# Patient Record
Sex: FEMALE | Race: WHITE | NOT HISPANIC OR LATINO | Employment: FULL TIME | URBAN - METROPOLITAN AREA
[De-identification: names, ages, dates, MRNs, and addresses within clinical notes are randomized per-mention and may not be internally consistent; named-entity substitution may affect disease eponyms.]

---

## 2017-04-21 ENCOUNTER — TRANSCRIBE ORDERS (OUTPATIENT)
Dept: ADMINISTRATIVE | Facility: HOSPITAL | Age: 32
End: 2017-04-21

## 2017-04-21 DIAGNOSIS — N64.89 BREASTS ASYMMETRICAL: Primary | ICD-10-CM

## 2017-05-22 ENCOUNTER — HOSPITAL ENCOUNTER (OUTPATIENT)
Dept: ULTRASOUND IMAGING | Facility: HOSPITAL | Age: 32
Discharge: HOME | End: 2017-05-22

## 2017-05-22 ENCOUNTER — HOSPITAL ENCOUNTER (OUTPATIENT)
Dept: MAMMOGRAPHY | Facility: HOSPITAL | Age: 32
Discharge: HOME | End: 2017-05-22
Attending: FAMILY MEDICINE | Admitting: FAMILY MEDICINE

## 2017-05-22 DIAGNOSIS — N64.89 BREASTS ASYMMETRICAL: ICD-10-CM

## 2017-05-22 PROCEDURE — 76642 ULTRASOUND BREAST LIMITED: CPT

## 2017-05-22 PROCEDURE — 77066 DX MAMMO INCL CAD BI: CPT | Performed by: RADIOLOGY

## 2017-05-22 PROCEDURE — 76642 ULTRASOUND BREAST LIMITED: CPT | Performed by: RADIOLOGY

## 2017-05-22 PROCEDURE — G0279 TOMOSYNTHESIS, MAMMO: HCPCS

## 2017-05-22 PROCEDURE — 77062 BREAST TOMOSYNTHESIS BI: CPT | Performed by: RADIOLOGY

## 2017-05-22 PROCEDURE — G0204 DX MAMMO INCL CAD BI: HCPCS

## 2019-05-09 ENCOUNTER — HOSPITAL ENCOUNTER (OUTPATIENT)
Dept: GENERAL RADIOLOGY | Facility: HOSPITAL | Age: 34
Discharge: HOME | End: 2019-05-09
Admitting: NURSE PRACTITIONER

## 2019-05-09 ENCOUNTER — TRANSCRIBE ORDERS (OUTPATIENT)
Dept: ADMINISTRATIVE | Facility: HOSPITAL | Age: 34
End: 2019-05-09

## 2019-05-09 DIAGNOSIS — Z87.442 HISTORY OF KIDNEY STONES: Primary | ICD-10-CM

## 2019-05-09 PROCEDURE — 74018 RADEX ABDOMEN 1 VIEW: CPT

## 2019-07-29 ENCOUNTER — CONSULT (OUTPATIENT)
Dept: SLEEP MEDICINE | Facility: HOSPITAL | Age: 34
End: 2019-07-29

## 2019-07-29 VITALS
WEIGHT: 225.8 LBS | HEIGHT: 63 IN | DIASTOLIC BLOOD PRESSURE: 63 MMHG | BODY MASS INDEX: 40.01 KG/M2 | HEART RATE: 98 BPM | OXYGEN SATURATION: 97 % | SYSTOLIC BLOOD PRESSURE: 134 MMHG

## 2019-07-29 DIAGNOSIS — E66.01 MORBID OBESITY (HCC): ICD-10-CM

## 2019-07-29 DIAGNOSIS — G47.33 OBSTRUCTIVE SLEEP APNEA, ADULT: ICD-10-CM

## 2019-07-29 DIAGNOSIS — R06.83 SNORING: Primary | ICD-10-CM

## 2019-07-29 PROCEDURE — 99204 OFFICE O/P NEW MOD 45 MIN: CPT | Performed by: INTERNAL MEDICINE

## 2019-07-29 RX ORDER — CHLORAL HYDRATE 500 MG
CAPSULE ORAL
COMMUNITY

## 2019-07-29 RX ORDER — ALBUTEROL SULFATE 90 UG/1
AEROSOL, METERED RESPIRATORY (INHALATION)
Refills: 4 | COMMUNITY
Start: 2019-07-25

## 2019-07-29 RX ORDER — ATORVASTATIN CALCIUM 40 MG/1
TABLET, FILM COATED ORAL
COMMUNITY
Start: 2016-11-23

## 2019-07-29 RX ORDER — PANTOPRAZOLE SODIUM 40 MG/1
TABLET, DELAYED RELEASE ORAL
Refills: 1 | COMMUNITY
Start: 2019-07-05

## 2019-07-29 RX ORDER — OLOPATADINE HYDROCHLORIDE 7 MG/ML
SOLUTION OPHTHALMIC
Refills: 5 | COMMUNITY
Start: 2019-07-25

## 2019-07-29 RX ORDER — DILTIAZEM HYDROCHLORIDE 60 MG/1
2 TABLET, FILM COATED ORAL 2 TIMES DAILY
Refills: 4 | COMMUNITY
Start: 2019-07-25

## 2019-07-29 RX ORDER — MONTELUKAST SODIUM 10 MG/1
10 TABLET ORAL DAILY
Refills: 5 | COMMUNITY
Start: 2019-07-25

## 2019-07-29 RX ORDER — LISINOPRIL 2.5 MG/1
2.5 TABLET ORAL DAILY
Refills: 5 | COMMUNITY
Start: 2019-07-07

## 2019-07-29 RX ORDER — NORGESTIMATE AND ETHINYL ESTRADIOL 7DAYSX3 28
KIT ORAL
COMMUNITY

## 2019-07-29 RX ORDER — METFORMIN HYDROCHLORIDE 500 MG/1
TABLET, EXTENDED RELEASE ORAL
COMMUNITY

## 2019-07-29 NOTE — PROGRESS NOTES
Subjective   Alessandra Villa is a 34 y.o. female is being seen for consultation today at the request of Chloe Power MD for the evaluation of snoring and nonrestorative sleep.    History of Present Illness  Patient states she has had snoring noted for at least 7 years.  She tosses and turns a lot at night she denies being told she had apneas.  She denies awakening gasping for breath.  Says she is generally not rested in the morning.  She has a morning headache almost daily.  She denies falling asleep if sitting quietly during the day.  She gets sleepy while driving occasionally.  She is sleepy even if she increases her time in bed.    She has history of loud snoring snores in all positions awaken with a dry mouth and sore throat.  She denies ever breaking her nose.  She has a history of reflux on medications.  She is rarely noted hypnagogue hallucinations and says she may have sleep paralysis 5 times per year.  She denies any history of cataplexy.  She denies kicking or jerking her legs at night.  She has some chronic pain in her back but does not think that it keeps her awake.  She admits to gaining 40 pounds in the past year.  She has a CDL and works as a .    She goes to bed about 9 PM.  She will fall asleep from 5 minutes to 1 hour.  She awakens 10 times during the night.  She thinks she gets 8 to 9 hours of sleep but is still tired.  She denies any history of hypertension she has occasional palpitations.  She has had diabetes known for 10 years.  She is been told she had polycystic ovary syndrome and also has kidney stones.  Allergies   Allergen Reactions   • Dapagliflozin Diarrhea   She has several environmental allergies.  Including cats, dust, mites, grass, trees, and mold.      Current Outpatient Medications:   •  albuterol sulfate  (90 Base) MCG/ACT inhaler, INHALE 2 PUFFS BY MOUTH EVERY SIX HOURS AS NEEDED, Disp: , Rfl: 4  •  atorvastatin (LIPITOR) 40 MG tablet, atorvastatin 40 mg tablet   Bedtime, Disp: , Rfl:   •  lisinopril (PRINIVIL,ZESTRIL) 2.5 MG tablet, Take 2.5 mg by mouth Daily. 200001, Disp: , Rfl: 5  •  metFORMIN ER (GLUCOPHAGE-XR) 500 MG 24 hr tablet, metformin  mg tablet,extended release 24 hr  Two times a day, Disp: , Rfl:   •  montelukast (SINGULAIR) 10 MG tablet, Take 10 mg by mouth Daily. 200001, Disp: , Rfl: 5  •  norgestimate-ethinyl estradiol (TRI-SPRINTEC) 0.18/0.215/0.25 MG-35 MCG per tablet, Tri-Sprintec (28) 0.18 mg(7)/0.215 mg(7)/0.25 mg(7)-35 mcg tablet, Disp: , Rfl:   •  Omega-3 1000 MG capsule, Omega 3, Disp: , Rfl:   •  pantoprazole (PROTONIX) 40 MG EC tablet, TAKE 1 TABLET BY MOUTH ONE TIME A DAY. (need appointment/labs for additional refills), Disp: , Rfl: 1  •  PAZEO 0.7 % solution, INSTILL 1 DROP IN EACH EYE EVERY TWELVE HOURS AS NEEDED, Disp: , Rfl: 5  •  SITagliptin (JANUVIA) 100 MG tablet, Januvia 100 mg tablet  Take 1 tab daily, Disp: , Rfl:   •  SYMBICORT 80-4.5 MCG/ACT inhaler, Inhale 2 puffs 2 (Two) Times a Day., Disp: , Rfl: 4    Social History    Tobacco Use      Smoking status: Never Smoker      Smokeless tobacco: Never Used       Social History     Substance and Sexual Activity   Alcohol Use No   • Frequency: Never       Caffeine: She occasionally has a cup of decaf coffee.  She has 3-4 servings of tea per week.  She has a cola 2-3 times per week    Past Medical History:   Diagnosis Date   • Diabetes mellitus (CMS/HCC)    • Kidney stone 2016       Past Surgical History:   Procedure Laterality Date   • KIDNEY STONE SURGERY  2016       Family History   Problem Relation Age of Onset   • Obesity Mother    • Sleep apnea Mother    • Stroke Father    • Obesity Father    • Asthma Father    • Obesity Brother    • Asthma Paternal Aunt    • Asthma Paternal Uncle    • Diabetes Maternal Grandmother    • Obesity Maternal Grandmother    • Thyroid disease Maternal Grandmother    • Diabetes Paternal Grandmother    • Obesity Paternal Grandmother    • Breast cancer Neg  "Hx    • Ovarian cancer Neg Hx        The following portions of the patient's history were reviewed and updated as appropriate: allergies, current medications, past family history, past medical history, past social history, past surgical history and problem list.    Review of Systems   Constitutional: Positive for fatigue and unexpected weight change.   HENT: Positive for postnasal drip and sinus pressure.    Eyes: Negative.    Respiratory: Positive for cough.    Gastrointestinal:        She complains of change in appetite and frequent heartburn   Endocrine: Positive for polydipsia and polyuria.   Genitourinary: Positive for frequency.   Musculoskeletal: Positive for arthralgias and back pain.   Skin: Negative.    Allergic/Immunologic: Positive for environmental allergies.   Neurological: Positive for numbness and headaches.   Hematological: Negative.    Psychiatric/Behavioral: Negative.    Lenox score is 4/24    Objective     /63   Pulse 98   Ht 160 cm (63\")   Wt 102 kg (225 lb 12.8 oz)   SpO2 97%   BMI 40.00 kg/m²      Physical Exam   Constitutional: She is oriented to person, place, and time. She appears well-developed and well-nourished.   She is morbidly obese.   HENT:   Head: Normocephalic and atraumatic.   She has nasal airway narrowing and Mallampati class II anatomy.   Eyes: EOM are normal. Pupils are equal, round, and reactive to light.   Neck: Normal range of motion. Neck supple.   Cardiovascular: Normal rate, regular rhythm and normal heart sounds.   Pulmonary/Chest: Effort normal and breath sounds normal.   Abdominal: Soft. Bowel sounds are normal.   Musculoskeletal: Normal range of motion. She exhibits no edema.   Neurological: She is alert and oriented to person, place, and time.   Skin: Skin is warm and dry.   Psychiatric: She has a normal mood and affect. Her behavior is normal.         Assessment/Plan   Alessandra was seen today for sleeping problem.    Diagnoses and all orders for this " visit:    Snoring  -     Home Sleep Study; Future    Obstructive sleep apnea, adult  -     Home Sleep Study; Future    Morbid obesity (CMS/[facility])  -     Ambulatory Referral to Weight Management Program    Patient has a history of snoring nonrestorative sleep.  I think gives an excellent story for obstructive sleep apnea.  We will plan to proceed to home sleep testing.  We will plan to do this with the watch pat device that has change of custody capability.  We have discussed possible treatments including CPAP, weight control, oral appliance, and surgery.  We have also discussed the long-term consequences of untreated obstructive sleep apnea.  She is encouraged to lose weight and is willing to be seen in weight management.  She is encouraged to avoid alcohol and sedatives close to bedtime.  She is encouraged to practice lateral position sleep.         Ed Coronado MD [facility]  Sleep Medicine  Pulmonary and Critical Care Medicine

## 2019-07-29 NOTE — PROGRESS NOTES
"[facility]  INITIAL OFFICE CONSULT      Alessandra Villa  1985  PCP: Chloe Power MD    SUBJECTIVE:   Alessandra Villa is a 34 y.o. female seen for a consultation visit regarding the following:     Chief Complaint:   Chief Complaint   Patient presents with   • Rapid Heart Rate          Consultation is requested by Chloe Power MD for evaluation of Rapid Heart Rate        History:  34-year-old female with no previous cardiac history presents today for initial consultation regarding palpitations atypical chest pain shortness of breath and fatigue with exertion.  Patient has a past medical history of dyslipidemia hypertension asthma diabetes probable sleep apnea.  In addition she reports that she is gained nearly 40 pounds since November.  She states far as back she remember when she was younger she is always had a heart rate that seems to \"race\" when she does physical exertion. She denies angina symptoms.  She states when she gets walks fast she is to the mailbox she heart rate goes very rapid and she seems to think this is resolving her down from doing a lot of physical activities.  She does try to work out few days a week with some light weights.  In addition she said atypical chest pain sharp knifelike discomfort not particular associate exertion had happens random in nature.  She denies any significant dizziness near syncope syncope.  She denies any heart failure symptoms such as orthopnea PND peripheral edema.  She notes that she is has a mother history of \"mitral valve prolapse.  Her father also has some heart disease but she is unclear of what.  In view of these and her risk factors for heart disease she wanted to pursue a initial cardiology work-up.      Cardiac PMH: (Old records have been reviewed and summarized below)  1. Palpitations  2. HLD: Statin  3. HTN: on ACE  4. Asthma  5. DM type II for ten years,Hb A1c 7.5 7/19  6. Obesity-40lbs weight gain past two " weeks.   7. Probable Sleep apnea      Past Medical History, Past Surgical History, Family history, Social History, and Medications were all reviewed with the patient today and updated as necessary.     Current Outpatient Medications   Medication Sig Dispense Refill   • albuterol sulfate  (90 Base) MCG/ACT inhaler INHALE 2 PUFFS BY MOUTH EVERY SIX HOURS AS NEEDED  4   • atorvastatin (LIPITOR) 40 MG tablet atorvastatin 40 mg tablet   Bedtime     • lisinopril (PRINIVIL,ZESTRIL) 2.5 MG tablet Take 2.5 mg by mouth Daily.  5   • metFORMIN ER (GLUCOPHAGE-XR) 500 MG 24 hr tablet metformin  mg tablet,extended release 24 hr   Two times a day     • montelukast (SINGULAIR) 10 MG tablet Take 10 mg by mouth Daily.  5   • norgestimate-ethinyl estradiol (TRI-SPRINTEC) 0.18/0.215/0.25 MG-35 MCG per tablet Tri-Sprintec (28) 0.18 mg(7)/0.215 mg(7)/0.25 mg(7)-35 mcg tablet     • Omega-3 1000 MG capsule Omega 3     • pantoprazole (PROTONIX) 40 MG EC tablet TAKE 1 TABLET BY MOUTH ONE TIME A DAY. (need appointment/labs for additional refills)  1   • PAZEO 0.7 % solution INSTILL 1 DROP IN EACH EYE EVERY TWELVE HOURS AS NEEDED  5   • SITagliptin (JANUVIA) 100 MG tablet Januvia 100 mg tablet   Take 1 tab daily     • SYMBICORT 80-4.5 MCG/ACT inhaler Inhale 2 puffs 2 (Two) Times a Day.  4     No current facility-administered medications for this visit.      Allergies   Allergen Reactions   • Dapagliflozin Diarrhea         Past Medical History:   Diagnosis Date   • Acid reflux    • Arthritis    • Chicken pox    • Diabetes mellitus (CMS/HCC)    • Hyperlipidemia    • Kidney stone 2016   • Snoring      Past Surgical History:   Procedure Laterality Date   • KIDNEY STONE SURGERY  2016     Family History   Problem Relation Age of Onset   • Obesity Mother    • Sleep apnea Mother    • Stroke Father    • Obesity Father    • Asthma Father    • Obesity Brother    • Asthma Paternal Aunt    • Asthma Paternal Uncle    • Diabetes Maternal  "Grandmother    • Obesity Maternal Grandmother    • Thyroid disease Maternal Grandmother    • Diabetes Paternal Grandmother    • Obesity Paternal Grandmother    • Breast cancer Neg Hx    • Ovarian cancer Neg Hx      Social History     Tobacco Use   • Smoking status: Never Smoker   • Smokeless tobacco: Never Used   Substance Use Topics   • Alcohol use: No     Frequency: Never       ROS:  Review of Symptoms:  General: no recent weight loss/gain, weakness or fatigue  Skin: no rashes, lumps, or other skin changes  HEENT: no dizziness, lightheadedness, or vision changes  Respiratory: no cough or hemoptysis  Cardiovascular: + palpitations, and tachycardia  Gastrointestinal: no black/tarry stools or diarrhea  Urinary: no change in frequency or urgency  Peripheral Vascular: no claudication or leg cramps  Musculoskeletal: + muscle aches,   Psychiatric: no depression or excessive stress  Neurological: no sensory or motor loss, no syncope  Hematologic: no anemia, easy bruising or bleeding  Endocrine: no thyroid problems, nor heat or cold intolerance         PHYSICAL EXAM:   /72 (BP Location: Left arm, Patient Position: Sitting)   Pulse 101   Ht 162.6 cm (64\")   Wt 102 kg (225 lb 3.2 oz)   SpO2 98%   BMI 38.66 kg/m²      Wt Readings from Last 5 Encounters:   07/30/19 102 kg (225 lb 3.2 oz)   07/29/19 102 kg (225 lb 12.8 oz)     BP Readings from Last 5 Encounters:   07/30/19 130/72   07/29/19 134/63       General-Well Nourished, Well developed  Eyes - PERRLA  Neck- supple, No mass  CV- regular rate and rhythm, no MRG  Lung- clear bilaterally  Abd- soft, +BS  Musc/skel - Norm strength and range of motion  Skin- warm and dry  Neuro - Alert & Oriented x 3, appropriate mood.    Medical problems and test results were reviewed with the patient today.     Results for orders placed or performed during the hospital encounter of 03/09/16   Stone analysis   Result Value Ref Range    Calcium phosphate, Stone 10 %    Color Tan     " Size Specimen received as whole stones. mm    Stone Weight 39.0 mg    Composition See comments     Ca Oxalate-Dihydrate, Stone 35 %    Nidus No Nidus visualized     Please note See comments     Comment See comments     Ca Oxalate - Monohydrate, Stone 55 %   CBC (No diff)   Result Value Ref Range    WBC 8.59 3.50 - 10.80 K/mcL    RBC 3.72 (L) 3.89 - 5.14 M/mcL    Hemoglobin 11.8 11.5 - 15.5 g/dL    Hematocrit 35.7 34.5 - 44.0 %    MCV 96.0 80.0 - 99.0 fL    MCH 31.7 (H) 27.0 - 31.0 pg    MCHC 33.1 32.0 - 36.0 g/dL    RDW-CV 12.9 11.3 - 14.5 %    Platelets 448 150 - 450 K/mcL   Pregnancy, urine   Result Value Ref Range    HCG Urine, QL Negative    POCT glucose fingerstick   Result Value Ref Range    Glucose 151 (H) 70 - 100 mg/dL   POCT glucose fingerstick   Result Value Ref Range    Glucose 200 (H) 70 - 100 mg/dL   Converted Surgical Pathology   Result Value Ref Range    CONVERTED (HISTORICAL) CASE TYPE Surgical Pathology     CONVERTED (HISTORICAL) ACCESSION NUMBER [id number]     CONVERTED (HISTORICAL) RESULT STATUS FINAL     CONVERTED (HISTORICAL) SPECIMEN DESCRIPTION Gross Only Specimen          No results found for: CHOL, HDL, HDLC, LDL, LDLC, VLDL    EKG:  (EKG/Tracing has been independently visualized by me and summarized below)      ECG 12 Lead  Date/Time: 7/30/2019 12:56 PM  Performed by: Irvin Lawrence PA  Authorized by: Irvin Lawrence PA   Comparison: not compared with previous ECG   Rhythm: sinus rhythm  Rate: normal  Conduction: conduction normal  ST Segments: ST segments normal  T Waves: T waves normal  QRS axis: normal  Other findings: poor R wave progression    Clinical impression: normal ECG            ASSESSMENT   1. Palpitations: Symptoms may be deep for deconditioning as she states her main symptoms when she walks her heart rate is elevated.  2. Chest pain, Atypical for angina   3. HTN:  Continue Zestril   4. HLD:  Statin  5. DAVIN: plan for upcoming sleep study.        PLAN  · Echocardiogram  to rule out any significant structural heart disease.   · Zio monitor to rule out any significant arrhthymias   · Continue to focus on risk factor modification, as discussed with the patient that she needs to increase her physical activities of aerobic exercises monitor calorie intake with diet exercise weight loss.  · Return for follow-up with our office in 1 month or sooner as needed.            Cardiology/Electrophysiology  07/30/19  11:18 AM  Will Howard BO

## 2019-07-30 ENCOUNTER — CONSULT (OUTPATIENT)
Dept: CARDIOLOGY | Facility: CLINIC | Age: 34
End: 2019-07-30

## 2019-07-30 VITALS
OXYGEN SATURATION: 98 % | SYSTOLIC BLOOD PRESSURE: 130 MMHG | HEART RATE: 101 BPM | DIASTOLIC BLOOD PRESSURE: 72 MMHG | HEIGHT: 64 IN | BODY MASS INDEX: 38.45 KG/M2 | WEIGHT: 225.2 LBS

## 2019-07-30 DIAGNOSIS — R00.2 PALPITATIONS: Primary | ICD-10-CM

## 2019-07-30 PROCEDURE — 93000 ELECTROCARDIOGRAM COMPLETE: CPT | Performed by: PHYSICIAN ASSISTANT

## 2019-07-30 PROCEDURE — 99243 OFF/OP CNSLTJ NEW/EST LOW 30: CPT | Performed by: PHYSICIAN ASSISTANT

## 2019-08-06 ENCOUNTER — HOSPITAL ENCOUNTER (OUTPATIENT)
Dept: SLEEP MEDICINE | Facility: HOSPITAL | Age: 34
Discharge: HOME | End: 2019-08-06
Admitting: INTERNAL MEDICINE

## 2019-08-06 VITALS
HEIGHT: 63 IN | WEIGHT: 225.2 LBS | DIASTOLIC BLOOD PRESSURE: 59 MMHG | SYSTOLIC BLOOD PRESSURE: 123 MMHG | HEART RATE: 105 BPM | BODY MASS INDEX: 39.9 KG/M2 | OXYGEN SATURATION: 97 %

## 2019-08-06 DIAGNOSIS — G47.33 OBSTRUCTIVE SLEEP APNEA, ADULT: ICD-10-CM

## 2019-08-06 DIAGNOSIS — R06.83 SNORING: ICD-10-CM

## 2019-08-06 PROCEDURE — 95800 SLP STDY UNATTENDED: CPT | Performed by: INTERNAL MEDICINE

## 2019-08-06 PROCEDURE — 95800 SLP STDY UNATTENDED: CPT

## 2019-08-07 DIAGNOSIS — R06.83 SNORING: ICD-10-CM

## 2019-08-07 DIAGNOSIS — G47.33 OBSTRUCTIVE SLEEP APNEA, ADULT: Primary | ICD-10-CM

## 2019-08-07 DIAGNOSIS — E66.01 MORBID OBESITY (HCC): ICD-10-CM

## 2019-08-13 ENCOUNTER — OFFICE VISIT (OUTPATIENT)
Dept: SLEEP MEDICINE | Facility: HOSPITAL | Age: 34
End: 2019-08-13

## 2019-08-13 VITALS
DIASTOLIC BLOOD PRESSURE: 76 MMHG | HEIGHT: 63 IN | WEIGHT: 225 LBS | SYSTOLIC BLOOD PRESSURE: 135 MMHG | OXYGEN SATURATION: 97 % | HEART RATE: 108 BPM | BODY MASS INDEX: 39.87 KG/M2

## 2019-08-13 DIAGNOSIS — G47.10 HYPERSOMNIA: ICD-10-CM

## 2019-08-13 DIAGNOSIS — E66.9 OBESITY (BMI 30-39.9): ICD-10-CM

## 2019-08-13 DIAGNOSIS — G47.33 OSA (OBSTRUCTIVE SLEEP APNEA): Primary | ICD-10-CM

## 2019-08-13 PROCEDURE — 99214 OFFICE O/P EST MOD 30 MIN: CPT | Performed by: INTERNAL MEDICINE

## 2019-08-13 NOTE — PROGRESS NOTES
Follow Up Office Visit      Patient Name: Alessandra Villa    Chief Complaint:    Chief Complaint   Patient presents with   • Follow-up       History of Present Illness: Alessandra Villa is a 34 y.o. female who is here today for follow up of sleep study results    34-year-old female with past medical history of diabetes, asthma, hypertension with complains of significant fatigue and daytime sleepiness presenting for initial evaluation.  Sleep study was ordered and she is here for follow-up on the results.  Patient is a  and states that she felt extremely sleepy while driving.  Denies any driving accidents or near misses.  She also has extreme fatigue.  She has been told that she snores.  Occasionally wakes up with dry mouth but not usually.  Denies any morning headaches.  Denies any leg edema.  Diabetes is not under good control and A1c apparently has gone up a little lately.  She is not doing much exercise and wanting to work towards weight loss but not very successful thus far.  She has caffeine intake form of sodas daily but does not think it is excessive amounts.  Denies any alcohol use or illicit drug use.  Denies any restless legs.  Denies any other symptom complex suggesting of narcolepsy.    Subjective      Review of Systems:   Review of Systems   Constitutional: Positive for fever and unexpected weight gain.   HENT: Negative.    Respiratory: Negative.    Cardiovascular: Negative.    Gastrointestinal: Negative.    Endocrine: Negative.    Musculoskeletal: Positive for arthralgias.   Skin: Negative.    Neurological: Negative.    Hematological: Negative.    Psychiatric/Behavioral: Positive for sleep disturbance.   All other systems reviewed and are negative.      The following portions of the patient's history were reviewed and updated as appropriate: allergies, current medications, past family history, past medical history, past social history, past surgical history and problem list.    Objective  "    Physical Exam:  Vital Signs:   Vitals:    08/13/19 1440   BP: 135/76   Pulse: 108   SpO2: 97%   Weight: 102 kg (225 lb)   Height: 160 cm (63\")       Physical Exam   Constitutional: She is oriented to person, place, and time. She appears well-developed and well-nourished. No distress.   HENT:   Head: Normocephalic and atraumatic.   Nose: Nose normal.   Mouth/Throat: Oropharynx is clear and moist. No oropharyngeal exudate.   Thrush: None  Mallampati Score: 2   Eyes: EOM are normal. Pupils are equal, round, and reactive to light. Right eye exhibits no discharge. Left eye exhibits no discharge.   Neck: Neck supple. No tracheal deviation present. No thyromegaly present.   Thick neck     Cardiovascular: Normal rate, regular rhythm and normal heart sounds. Exam reveals no friction rub.   No murmur heard.  Pulmonary/Chest: Effort normal and breath sounds normal. No respiratory distress. She has no wheezes. She has no rales.   Musculoskeletal: She exhibits no edema or tenderness.   Lymphadenopathy:     She has no cervical adenopathy.   Neurological: She is alert and oriented to person, place, and time.   Skin: She is not diaphoretic.   Psychiatric: She has a normal mood and affect. Her behavior is normal. Judgment and thought content normal.   Nursing note and vitals reviewed.      Results Review:     Home sleep test reviewed with the patient in detail.  Showed AHI of 6.8.  Mild sleep-related hypoxia noted.  Loud snoring noted.      Assessment / Plan      Assessment:   Problem List Items Addressed This Visit     None      Visit Diagnoses     DAVIN (obstructive sleep apnea)    -  Primary    Obesity (BMI 30-39.9)        Hypersomnia              Plan:     1.  Patient with mild sleep apnea with sleep apnea syndrome.  Significant fatigue and sleepiness during the daytime along with compatible history.  Watch Pat device shows mild sleep apnea with mild sleep related hypoxia and loud snoring.  Discussed the results in detail with " the patient and treatment options reviewed.  We discussed CPAP therapy which will be the gold standard therapy, oral appliance therapy, surgical options as well as inspire therapy which she will not be candidate for due to her BMI reviewed.  Patient is willing to try CPAP therapy and she is familiar with that as her grandmother has it as well.  We will get her on auto CPAP and follow her closely to see if we can treat her sleep apnea and improve her symptomatology.  Patient is comfortable and agreeable with this plan.    2.  Side effects of untreated sleep apnea reviewed.  Patient seems committed to be treated for the sleep apnea.  Counseled her on good sleep hygiene.  Given that she is a 's imperative that we treat her sleep apnea and improve her somnolence.    3.  Increasing activity and weight loss counseled and its impact on sleep apnea reviewed with her.    We will follow her 2 months after starting the CPAP therapy to go over the download and see if any further adjustments needed.    25 minutes spent in visit, reviewing things, discussion and counseling with more than 50% time spent in face-to-face counseling on weight loss, good sleep hygiene and treatment options.    Follow Up:   Return in about 2 months (around 10/13/2019) for Recheck.    Discussed plan of care in detail with patient today. Patient verbally understands and agrees.     Umer Jean MD  Pulmonary Critical Care and Sleep Medicine    Please note that portions of this note may have been completed with a voice recognition program. Efforts were made to edit the dictations, but occasionally words are mistranscribed.

## 2019-08-25 ENCOUNTER — HOSPITAL ENCOUNTER (OUTPATIENT)
Dept: CARDIOLOGY | Facility: HOSPITAL | Age: 34
Discharge: HOME | End: 2019-08-25
Admitting: PHYSICIAN ASSISTANT

## 2019-08-25 VITALS — HEIGHT: 63 IN | WEIGHT: 225 LBS | BODY MASS INDEX: 39.87 KG/M2

## 2019-08-25 DIAGNOSIS — R00.2 PALPITATIONS: ICD-10-CM

## 2019-08-25 LAB
ASCENDING AORTA: 2.8 CM
BH CV ECHO MEAS - AO MAX PG (FULL): 5.9 MMHG
BH CV ECHO MEAS - AO MAX PG: 10.1 MMHG
BH CV ECHO MEAS - AO MEAN PG (FULL): 4.3 MMHG
BH CV ECHO MEAS - AO MEAN PG: 6.5 MMHG
BH CV ECHO MEAS - AO ROOT AREA (BSA CORRECTED): 1.3
BH CV ECHO MEAS - AO ROOT AREA: 5.9 CM^2
BH CV ECHO MEAS - AO ROOT DIAM: 2.7 CM
BH CV ECHO MEAS - AO V2 MAX: 158.8 CM/SEC
BH CV ECHO MEAS - AO V2 MEAN: 121 CM/SEC
BH CV ECHO MEAS - AO V2 VTI: 34.1 CM
BH CV ECHO MEAS - ASC AORTA: 2.8 CM
BH CV ECHO MEAS - AVA(I,A): 2 CM^2
BH CV ECHO MEAS - AVA(I,D): 2 CM^2
BH CV ECHO MEAS - AVA(V,A): 2 CM^2
BH CV ECHO MEAS - AVA(V,D): 2 CM^2
BH CV ECHO MEAS - BSA(HAYCOCK): 2.2 M^2
BH CV ECHO MEAS - BSA: 2 M^2
BH CV ECHO MEAS - BZI_BMI: 39.9 KILOGRAMS/M^2
BH CV ECHO MEAS - BZI_METRIC_HEIGHT: 160 CM
BH CV ECHO MEAS - BZI_METRIC_WEIGHT: 102.1 KG
BH CV ECHO MEAS - EDV(CUBED): 88.9 ML
BH CV ECHO MEAS - EDV(MOD-SP2): 46 ML
BH CV ECHO MEAS - EDV(MOD-SP4): 65 ML
BH CV ECHO MEAS - EDV(TEICH): 90.7 ML
BH CV ECHO MEAS - EF(CUBED): 71.5 %
BH CV ECHO MEAS - EF(MOD-BP): 63 %
BH CV ECHO MEAS - EF(MOD-SP2): 63 %
BH CV ECHO MEAS - EF(MOD-SP4): 61.5 %
BH CV ECHO MEAS - EF(TEICH): 63.3 %
BH CV ECHO MEAS - ESV(CUBED): 25.4 ML
BH CV ECHO MEAS - ESV(MOD-SP2): 17 ML
BH CV ECHO MEAS - ESV(MOD-SP4): 25 ML
BH CV ECHO MEAS - ESV(TEICH): 33.3 ML
BH CV ECHO MEAS - FS: 34.2 %
BH CV ECHO MEAS - IVS/LVPW: 1
BH CV ECHO MEAS - IVSD: 1 CM
BH CV ECHO MEAS - LA DIMENSION: 3.1 CM
BH CV ECHO MEAS - LA/AO: 1.1
BH CV ECHO MEAS - LAD MAJOR: 4.7 CM
BH CV ECHO MEAS - LAT PEAK E' VEL: 18 CM/SEC
BH CV ECHO MEAS - LATERAL E/E' RATIO: 4.2
BH CV ECHO MEAS - LV DIASTOLIC VOL/BSA (35-75): 32 ML/M^2
BH CV ECHO MEAS - LV MASS(C)D: 143.3 GRAMS
BH CV ECHO MEAS - LV MASS(C)DI: 70.5 GRAMS/M^2
BH CV ECHO MEAS - LV MAX PG: 4.2 MMHG
BH CV ECHO MEAS - LV MEAN PG: 2.2 MMHG
BH CV ECHO MEAS - LV SYSTOLIC VOL/BSA (12-30): 12.3 ML/M^2
BH CV ECHO MEAS - LV V1 MAX: 102.8 CM/SEC
BH CV ECHO MEAS - LV V1 MEAN: 66.8 CM/SEC
BH CV ECHO MEAS - LV V1 VTI: 21.6 CM
BH CV ECHO MEAS - LVIDD: 4.5 CM
BH CV ECHO MEAS - LVIDS: 2.9 CM
BH CV ECHO MEAS - LVLD AP2: 7.5 CM
BH CV ECHO MEAS - LVLD AP4: 7.1 CM
BH CV ECHO MEAS - LVLS AP2: 5.8 CM
BH CV ECHO MEAS - LVLS AP4: 6.1 CM
BH CV ECHO MEAS - LVOT AREA (M): 3.1 CM^2
BH CV ECHO MEAS - LVOT AREA: 3.2 CM^2
BH CV ECHO MEAS - LVOT DIAM: 2 CM
BH CV ECHO MEAS - LVPWD: 1 CM
BH CV ECHO MEAS - MED PEAK E' VEL: 9.9 CM/SEC
BH CV ECHO MEAS - MEDIAL E/E' RATIO: 7.7
BH CV ECHO MEAS - MV A MAX VEL: 49.1 CM/SEC
BH CV ECHO MEAS - MV DEC TIME: 0.32 SEC
BH CV ECHO MEAS - MV E MAX VEL: 76.8 CM/SEC
BH CV ECHO MEAS - MV E/A: 1.6
BH CV ECHO MEAS - MV MAX PG: 4.6 MMHG
BH CV ECHO MEAS - MV MEAN PG: 2.4 MMHG
BH CV ECHO MEAS - MV V2 MAX: 107.5 CM/SEC
BH CV ECHO MEAS - MV V2 MEAN: 73.9 CM/SEC
BH CV ECHO MEAS - MV V2 VTI: 26.8 CM
BH CV ECHO MEAS - MVA(VTI): 2.5 CM^2
BH CV ECHO MEAS - PA ACC SLOPE: 337.7 CM/SEC^2
BH CV ECHO MEAS - PA ACC TIME: 0.15 SEC
BH CV ECHO MEAS - PA MAX PG: 4.6 MMHG
BH CV ECHO MEAS - PA PR(ACCEL): 9.3 MMHG
BH CV ECHO MEAS - PA V2 MAX: 106.7 CM/SEC
BH CV ECHO MEAS - PI END-D VEL: 93.1 CM/SEC
BH CV ECHO MEAS - RVSP: 3 MMHG
BH CV ECHO MEAS - SI(AO): 99 ML/M^2
BH CV ECHO MEAS - SI(CUBED): 31.3 ML/M^2
BH CV ECHO MEAS - SI(LVOT): 33.6 ML/M^2
BH CV ECHO MEAS - SI(MOD-SP2): 14.3 ML/M^2
BH CV ECHO MEAS - SI(MOD-SP4): 19.7 ML/M^2
BH CV ECHO MEAS - SI(TEICH): 28.2 ML/M^2
BH CV ECHO MEAS - SV(AO): 201.2 ML
BH CV ECHO MEAS - SV(CUBED): 63.5 ML
BH CV ECHO MEAS - SV(LVOT): 68.2 ML
BH CV ECHO MEAS - SV(MOD-SP2): 29 ML
BH CV ECHO MEAS - SV(MOD-SP4): 40 ML
BH CV ECHO MEAS - SV(TEICH): 57.4 ML
BH CV ECHO MEAS - TAPSE (>1.6): 2.8 CM2
BH CV ECHO MEAS - TR MAX PG: 15 MMHG
BH CV ECHO MEAS - TR MAX VEL: 193.9 CM/SEC
BH CV ECHO MEASUREMENTS AVERAGE E/E' RATIO: 5.51
BH CV VAS BP LEFT ARM: NORMAL MMHG
BH CV XLRA - RV BASE: 3.2 CM
BH CV XLRA - RV LENGTH: 6.5 CM
BH CV XLRA - RV MID: 3.1 CM
BH CV XLRA - TDI S': 14.5 CM/SEC
LEFT ATRIUM VOLUME INDEX: 15.2 ML/M^2
LEFT ATRIUM VOLUME: 31 ML
LV EF 2D ECHO EST: 66 %
MV VENA CONTRACTA: 0.3 CM

## 2019-08-25 PROCEDURE — 93306 TTE W/DOPPLER COMPLETE: CPT

## 2019-08-25 PROCEDURE — 93306 TTE W/DOPPLER COMPLETE: CPT | Performed by: INTERNAL MEDICINE

## 2019-10-29 ENCOUNTER — OFFICE VISIT (OUTPATIENT)
Dept: SLEEP MEDICINE | Facility: HOSPITAL | Age: 34
End: 2019-10-29

## 2019-10-29 VITALS
HEIGHT: 63 IN | BODY MASS INDEX: 41.71 KG/M2 | WEIGHT: 235.4 LBS | DIASTOLIC BLOOD PRESSURE: 68 MMHG | HEART RATE: 98 BPM | SYSTOLIC BLOOD PRESSURE: 132 MMHG | OXYGEN SATURATION: 98 %

## 2019-10-29 DIAGNOSIS — G47.33 OSA (OBSTRUCTIVE SLEEP APNEA): Primary | ICD-10-CM

## 2019-10-29 PROCEDURE — 99212 OFFICE O/P EST SF 10 MIN: CPT | Performed by: NURSE PRACTITIONER

## 2019-10-29 NOTE — PROGRESS NOTES
"    Chief Complaint:   Chief Complaint   Patient presents with   • Follow-up       HPI:    Alessandra Villa is a 34 y.o. female here for follow-up of sleep apnea.  On 8/13/2019 patient was seen in consult for excessive daytime sleepiness, fatigue, snoring.  Patient is sleeping 8 hours nightly and still tired upon awakening.  Patient has an [place] score of 7/24.  Patient did initiate CPAP therapy and can tell a small difference when wearing versus not wearing.  Patient states she can get 4 to 5 hours nightly and then will take it off during the night.  When she wears her CPAP her mind is not as \"foggy.\"  Patient does wish to continue with CPAP.        Current medications are:   Current Outpatient Medications:   •  albuterol sulfate  (90 Base) MCG/ACT inhaler, INHALE 2 PUFFS BY MOUTH EVERY SIX HOURS AS NEEDED, Disp: , Rfl: 4  •  atorvastatin (LIPITOR) 40 MG tablet, atorvastatin 40 mg tablet  Bedtime, Disp: , Rfl:   •  lisinopril (PRINIVIL,ZESTRIL) 2.5 MG tablet, Take 2.5 mg by mouth Daily., Disp: , Rfl: 5  •  MAGNESIUM CITRATE PO, Take  by mouth 2 (Two) Times a Day., Disp: , Rfl:   •  metFORMIN ER (GLUCOPHAGE-XR) 500 MG 24 hr tablet, metformin  mg tablet,extended release 24 hr  Two times a day, Disp: , Rfl:   •  montelukast (SINGULAIR) 10 MG tablet, Take 10 mg by mouth Daily., Disp: , Rfl: 5  •  Multiple Vitamins-Minerals (MULTIVITAMIN ADULT PO), Take  by mouth., Disp: , Rfl:   •  norgestimate-ethinyl estradiol (TRI-SPRINTEC) 0.18/0.215/0.25 MG-35 MCG per tablet, Tri-Sprintec (28) 0.18 mg(7)/0.215 mg(7)/0.25 mg(7)-35 mcg tablet, Disp: , Rfl:   •  Omega-3 1000 MG capsule, Omega 3, Disp: , Rfl:   •  pantoprazole (PROTONIX) 40 MG EC tablet, TAKE 1 TABLET BY MOUTH ONE TIME A DAY. (need appointment/labs for additional refills), Disp: , Rfl: 1  •  PAZEO 0.7 % solution, INSTILL 1 DROP IN EACH EYE EVERY TWELVE HOURS AS NEEDED, Disp: , Rfl: 5  •  Semaglutide,0.25 or 0.5MG/DOS, (OZEMPIC, 0.25 OR 0.5 MG/DOSE,) 2 " MG/1.5ML solution pen-injector, 0.5 mg., Disp: , Rfl:   •  SITagliptin (JANUVIA) 100 MG tablet, Januvia 100 mg tablet  Take 1 tab daily, Disp: , Rfl:   •  SYMBICORT 80-4.5 MCG/ACT inhaler, Inhale 2 puffs 2 (Two) Times a Day., Disp: , Rfl: 4.      The patient's relevant past medical, surgical, family and social history were reviewed and updated in Epic as appropriate.       Review of Systems   Constitutional: Positive for unexpected weight change.   Respiratory: Positive for apnea.    Musculoskeletal: Positive for arthralgias.   Psychiatric/Behavioral: Positive for sleep disturbance.   All other systems reviewed and are negative.        Objective:    Physical Exam   Constitutional: She is oriented to person, place, and time. She appears well-developed and well-nourished.   HENT:   Head: Normocephalic and atraumatic.   Mouth/Throat: Oropharynx is clear and moist.   Mallampati 2 anatomy   Eyes: Conjunctivae are normal.   Neck: Neck supple. No thyromegaly present.   Cardiovascular: Normal rate and regular rhythm.   Pulmonary/Chest: Effort normal and breath sounds normal.   Lymphadenopathy:     She has no cervical adenopathy.   Neurological: She is alert and oriented to person, place, and time.   Skin: Skin is warm and dry.   Psychiatric: She has a normal mood and affect. Her behavior is normal. Judgment and thought content normal.   Nursing note and vitals reviewed.    57/67 days of use.  Greater than 4-hour use 73.1%.  90% pressure 8.7.  AHI of 0.6.  Download reviewed with patient.    ASSESSMENT/PLAN    Alessandra was seen today for follow-up.    Diagnoses and all orders for this visit:    DAVIN (obstructive sleep apnea)  -     CPAP Therapy            1. Counseled patient regarding multimodal approach with healthy nutrition, healthy sleep, regular physical activity, social activities, counseling, and medications. Encouraged to practice lateral sleep position. Avoid alcohol and sedatives close to bedtime.  2. Refill supplies  x1 year.  Return to clinic 1 year sooner symptoms warrant.  Patient advised to increase use.    I have reviewed the results of my evaluation and impression and discussed my recommendations in detail with the patient.      Signed by  Emperatriz Prasad, APRN    October 29, 2019      CC: Chloe Power MD          No ref. provider found

## 2020-10-01 PROCEDURE — U0003 INFECTIOUS AGENT DETECTION BY NUCLEIC ACID (DNA OR RNA); SEVERE ACUTE RESPIRATORY SYNDROME CORONAVIRUS 2 (SARS-COV-2) (CORONAVIRUS DISEASE [COVID-19]), AMPLIFIED PROBE TECHNIQUE, MAKING USE OF HIGH THROUGHPUT TECHNOLOGIES AS DESCRIBED BY CMS-2020-01-R: HCPCS | Performed by: FAMILY MEDICINE

## 2020-10-03 ENCOUNTER — TELEPHONE (OUTPATIENT)
Dept: URGENT CARE | Facility: CLINIC | Age: 35
End: 2020-10-03

## 2020-10-03 NOTE — TELEPHONE ENCOUNTER
Reviewed by Selam SHABAZZ. Spoke with Pt informed lab result for Covid-19 was positive. Pt verbalized understanding stated she is feeling better but still having congestion, headache. Informed Pt she will need to quarantine for 14 days and the [facility] Dept will be following up with her to see how she is doing. Informed Pt to treat symptoms at home if develops shortness of breath go to the ED for further evaluation. Pt verbalized understanding no further questions. Filled/faxed documentation to the [facility] Department [phone]

## 2020-10-29 ENCOUNTER — OFFICE VISIT (OUTPATIENT)
Dept: SLEEP MEDICINE | Facility: HOSPITAL | Age: 35
End: 2020-10-29

## 2020-10-29 VITALS
WEIGHT: 225 LBS | HEIGHT: 64 IN | BODY MASS INDEX: 38.41 KG/M2 | DIASTOLIC BLOOD PRESSURE: 74 MMHG | OXYGEN SATURATION: 98 % | HEART RATE: 90 BPM | SYSTOLIC BLOOD PRESSURE: 127 MMHG

## 2020-10-29 DIAGNOSIS — G47.33 OSA (OBSTRUCTIVE SLEEP APNEA): Primary | ICD-10-CM

## 2020-10-29 PROCEDURE — 99212 OFFICE O/P EST SF 10 MIN: CPT | Performed by: NURSE PRACTITIONER

## 2020-10-29 RX ORDER — GLIPIZIDE 5 MG/1
TABLET, FILM COATED, EXTENDED RELEASE ORAL EVERY 12 HOURS SCHEDULED
COMMUNITY

## 2020-10-29 NOTE — PROGRESS NOTES
Chief Complaint:   Chief Complaint   Patient presents with   • Follow-up       HPI:    Alessandra Villa is a 35 y.o. female here for follow-up of sleep apnea.  Patient was last seen 10/29/2019.  Patient does have sporadic use of CPAP therapy.  She does state some nights she will have an anxiety attack or an asthma attack and she does not wear her CPAP on these nights.  She is sleeping 4 to 5 hours nightly which is normal for her she does feel rested in the mornings and has an [place] score of 2/24.  Patient will go to sleep within 15 minutes and only occasionally will get up x1.  Patient does understand the consequences of untreated sleep apnea and that she must increase her use.  Patient has no concerns other than noted above and will continue CPAP.        Current medications are:   Current Outpatient Medications:   •  albuterol sulfate  (90 Base) MCG/ACT inhaler, INHALE 2 PUFFS BY MOUTH EVERY SIX HOURS AS NEEDED, Disp: , Rfl: 4  •  albuterol sulfate  (90 Base) MCG/ACT inhaler, Inhale 2 puffs Every 4 (Four) Hours As Needed for Wheezing., Disp: 6.7 g, Rfl: 0  •  atorvastatin (LIPITOR) 40 MG tablet, atorvastatin 40 mg tablet  Bedtime, Disp: , Rfl:   •  glipizide (GLUCOTROL XL) 5 MG ER tablet, Every 12 (Twelve) Hours., Disp: , Rfl:   •  lisinopril (PRINIVIL,ZESTRIL) 2.5 MG tablet, Take 2.5 mg by mouth Daily., Disp: , Rfl: 5  •  MAGNESIUM CITRATE PO, Take  by mouth 2 (Two) Times a Day., Disp: , Rfl:   •  metFORMIN ER (GLUCOPHAGE-XR) 500 MG 24 hr tablet, metformin  mg tablet,extended release 24 hr  Two times a day, Disp: , Rfl:   •  montelukast (SINGULAIR) 10 MG tablet, Take 10 mg by mouth Daily., Disp: , Rfl: 5  •  Multiple Vitamins-Minerals (MULTIVITAMIN ADULT PO), Take  by mouth., Disp: , Rfl:   •  norgestimate-ethinyl estradiol (TRI-SPRINTEC) 0.18/0.215/0.25 MG-35 MCG per tablet, Tri-Sprintec (28) 0.18 mg(7)/0.215 mg(7)/0.25 mg(7)-35 mcg tablet, Disp: , Rfl:   •  Omega-3 1000 MG capsule, Omega  3, Disp: , Rfl:   •  pantoprazole (PROTONIX) 40 MG EC tablet, TAKE 1 TABLET BY MOUTH ONE TIME A DAY. (need appointment/labs for additional refills), Disp: , Rfl: 1  •  SITagliptin (JANUVIA) 100 MG tablet, Januvia 100 mg tablet  Take 1 tab daily, Disp: , Rfl:   •  SYMBICORT 80-4.5 MCG/ACT inhaler, Inhale 2 puffs 2 (Two) Times a Day., Disp: , Rfl: 4  •  azithromycin (ZITHROMAX) 250 MG tablet, 2 tabs day 1, 1 tab days 2-5, Disp: 6 tablet, Rfl: 0  •  brompheniramine-pseudoephedrine-DM 30-2-10 MG/5ML syrup, Take 5 mL by mouth 4 (Four) Times a Day As Needed for Allergies., Disp: 118 mL, Rfl: 0  •  PAZEO 0.7 % solution, INSTILL 1 DROP IN EACH EYE EVERY TWELVE HOURS AS NEEDED, Disp: , Rfl: 5  •  predniSONE (DELTASONE) 20 MG tablet, 3 tabs for 2 days, 2 tabs for 2 days, 1 tab for 2 days., Disp: 12 tablet, Rfl: 0  •  Semaglutide,0.25 or 0.5MG/DOS, (OZEMPIC, 0.25 OR 0.5 MG/DOSE,) 2 MG/1.5ML solution pen-injector, 0.5 mg., Disp: , Rfl: .      The patient's relevant past medical, surgical, family and social history were reviewed and updated in Epic as appropriate.       Review of Systems   Respiratory: Positive for apnea, cough, chest tightness, shortness of breath and wheezing.    Gastrointestinal:        Heartburn   Musculoskeletal: Positive for arthralgias.   Allergic/Immunologic: Positive for environmental allergies.   Psychiatric/Behavioral: Positive for sleep disturbance.   All other systems reviewed and are negative.        Objective:    Physical Exam  Vitals signs reviewed.   Constitutional:       Appearance: Normal appearance. She is obese.   HENT:      Head: Normocephalic and atraumatic.      Mouth/Throat:      Mouth: Mucous membranes are moist.      Pharynx: Oropharynx is clear.      Comments: Class 2 airway  Eyes:      Conjunctiva/sclera: Conjunctivae normal.      Pupils: Pupils are equal, round, and reactive to light.   Pulmonary:      Effort: Pulmonary effort is normal. No respiratory distress.   Skin:     General:  Skin is warm and dry.   Neurological:      Mental Status: She is alert and oriented to person, place, and time.   Psychiatric:         Mood and Affect: Mood normal.         Behavior: Behavior normal.         Thought Content: Thought content normal.         Judgment: Judgment normal.     37/51 days of use  Greater than 4-hour use 43.1%  90% pressure 8.9  AHI of 0.3  Settings 7.5-17.      ASSESSMENT/PLAN    Diagnoses and all orders for this visit:    1. DAVIN (obstructive sleep apnea) (Primary)  -     CPAP Therapy            1. Counseled patient regarding multimodal approach with healthy nutrition, healthy sleep, regular physical activity, social activities, counseling, and medications. Encouraged to practice lateral  sleep position. Avoid alcohol and sedatives close to bedtime.  2.   Refill supplies x1 year.  Return to clinic 1 year or sooner symptoms warrant.  Patient to continue to increase use.  I have reviewed the results of my evaluation and impression and discussed my recommendations in detail with the patient.      Signed by  MELE Brooks    October 29, 2020      CC: Chloe Power MD          No ref. provider found

## 2021-10-28 ENCOUNTER — OFFICE VISIT (OUTPATIENT)
Dept: SLEEP MEDICINE | Facility: HOSPITAL | Age: 36
End: 2021-10-28

## 2021-10-28 VITALS
WEIGHT: 215 LBS | SYSTOLIC BLOOD PRESSURE: 145 MMHG | OXYGEN SATURATION: 98 % | DIASTOLIC BLOOD PRESSURE: 69 MMHG | BODY MASS INDEX: 36.7 KG/M2 | HEIGHT: 64 IN | HEART RATE: 101 BPM

## 2021-10-28 DIAGNOSIS — G47.33 OSA (OBSTRUCTIVE SLEEP APNEA): Primary | ICD-10-CM

## 2021-10-28 PROCEDURE — 99213 OFFICE O/P EST LOW 20 MIN: CPT | Performed by: NURSE PRACTITIONER

## 2021-10-28 NOTE — PROGRESS NOTES
Chief Complaint:   Chief Complaint   Patient presents with   • Follow-up       HPI:    Alessandra Villa is a 36 y.o. female here for follow-up of sleep apnea.  Patient was last seen 10/29/2020.  Patient does not feel she has sleep apnea and is going to pay cash pay for home sleep study.  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  Patient states CPAP is very uncomfortable for her to wear and does sleep much better when she does not wear it.  She has an [place] score of 1/24.  She is going to continue use until her study.        Current medications are:   Current Outpatient Medications:   •  albuterol sulfate  (90 Base) MCG/ACT inhaler, INHALE 2 PUFFS BY MOUTH EVERY SIX HOURS AS NEEDED, Disp: , Rfl: 4  •  albuterol sulfate  (90 Base) MCG/ACT inhaler, Inhale 2 puffs Every 4 (Four) Hours As Needed for Wheezing., Disp: 6.7 g, Rfl: 0  •  atorvastatin (LIPITOR) 40 MG tablet, atorvastatin 40 mg tablet  Bedtime, Disp: , Rfl:   •  azithromycin (ZITHROMAX) 250 MG tablet, 2 tabs day 1, 1 tab days 2-5, Disp: 6 tablet, Rfl: 0  •  brompheniramine-pseudoephedrine-DM 30-2-10 MG/5ML syrup, Take 5 mL by mouth 4 (Four) Times a Day As Needed for Allergies., Disp: 118 mL, Rfl: 0  •  glipizide (GLUCOTROL XL) 5 MG ER tablet, Every 12 (Twelve) Hours., Disp: , Rfl:   •  lisinopril (PRINIVIL,ZESTRIL) 2.5 MG tablet, Take 2.5 mg by mouth Daily., Disp: , Rfl: 5  •  MAGNESIUM CITRATE PO, Take  by mouth 2 (Two) Times a Day., Disp: , Rfl:   •  metFORMIN ER (GLUCOPHAGE-XR) 500 MG 24 hr tablet, metformin  mg tablet,extended release 24 hr  Two times a day, Disp: , Rfl:   •  montelukast (SINGULAIR) 10 MG tablet, Take 10 mg by mouth Daily., Disp: , Rfl: 5  •  Multiple Vitamins-Minerals (MULTIVITAMIN ADULT PO), Take  by mouth., Disp: , Rfl:   •  norgestimate-ethinyl estradiol (TRI-SPRINTEC) 0.18/0.215/0.25 MG-35 MCG per tablet, Tri-Sprintec (28) 0.18  mg(7)/0.215 mg(7)/0.25 mg(7)-35 mcg tablet, Disp: , Rfl:   •  Omega-3 1000 MG capsule, Omega 3, Disp: , Rfl:   •  pantoprazole (PROTONIX) 40 MG EC tablet, TAKE 1 TABLET BY MOUTH ONE TIME A DAY. (need appointment/labs for additional refills), Disp: , Rfl: 1  •  PAZEO 0.7 % solution, INSTILL 1 DROP IN EACH EYE EVERY TWELVE HOURS AS NEEDED, Disp: , Rfl: 5  •  predniSONE (DELTASONE) 20 MG tablet, 3 tabs for 2 days, 2 tabs for 2 days, 1 tab for 2 days., Disp: 12 tablet, Rfl: 0  •  Semaglutide,0.25 or 0.5MG/DOS, (OZEMPIC, 0.25 OR 0.5 MG/DOSE,) 2 MG/1.5ML solution pen-injector, 0.5 mg., Disp: , Rfl:   •  SITagliptin (JANUVIA) 100 MG tablet, Januvia 100 mg tablet  Take 1 tab daily, Disp: , Rfl:   •  SYMBICORT 80-4.5 MCG/ACT inhaler, Inhale 2 puffs 2 (Two) Times a Day., Disp: , Rfl: 4.      The patient's relevant past medical, surgical, family and social history were reviewed and updated in Epic as appropriate.       Review of Systems   Respiratory: Positive for apnea, cough, chest tightness, shortness of breath and wheezing.    Gastrointestinal:        Heartburn   Musculoskeletal: Positive for arthralgias.   Psychiatric/Behavioral: Positive for sleep disturbance.   All other systems reviewed and are negative.        Objective:    Physical Exam  Constitutional:       Appearance: Normal appearance.   HENT:      Head: Normocephalic and atraumatic.      Mouth/Throat:      Pharynx: Oropharynx is clear.      Comments: Mallampati 2 anatomy  Pulmonary:      Effort: Pulmonary effort is normal.   Neurological:      Mental Status: She is alert and oriented to person, place, and time.   Psychiatric:         Mood and Affect: Mood normal.         Behavior: Behavior normal.         Thought Content: Thought content normal.         Judgment: Judgment normal.     47/60 days of use  Greater than 4-hour use 68.3  90% pressure 8.5  AHI 1.1  Pressure settings 7.5-17    ASSESSMENT/PLAN    Diagnoses and all orders for this visit:    1. DAVIN  (obstructive sleep apnea) (Primary)  -     CPAP Therapy  -     Home Sleep Study; Future            1. Counseled patient regarding multimodal approach with healthy nutrition, healthy sleep, regular physical activity, social activities, counseling, and medications. Encouraged to practice lateral sleep position. Avoid alcohol and sedatives close to bedtime.  2. Refill supplies x1 year.  Patient is going to have a home sleep study that she is going to pay for with cash I will call her with these results and we will move forward from there    I have reviewed the results of my evaluation and impression and discussed my recommendations in detail with the patient.      Signed by  Emperatriz Prasad, MELE    October 28, 2021      CC: Chloe Power MD (Inactive)          No ref. provider found

## 2022-01-11 ENCOUNTER — TRANSCRIBE ORDERS (OUTPATIENT)
Dept: ADMINISTRATIVE | Facility: HOSPITAL | Age: 37
End: 2022-01-11

## 2022-01-11 ENCOUNTER — HOSPITAL ENCOUNTER (OUTPATIENT)
Dept: GENERAL RADIOLOGY | Facility: HOSPITAL | Age: 37
Discharge: HOME | End: 2022-01-11
Admitting: PHYSICIAN ASSISTANT

## 2022-01-11 DIAGNOSIS — R10.9 ACUTE RIGHT FLANK PAIN: ICD-10-CM

## 2022-01-11 DIAGNOSIS — R10.9 ACUTE RIGHT FLANK PAIN: Primary | ICD-10-CM

## 2022-01-11 PROCEDURE — 74018 RADEX ABDOMEN 1 VIEW: CPT

## 2023-08-21 ENCOUNTER — TRANSCRIBE ORDERS (OUTPATIENT)
Dept: LAB | Facility: HOSPITAL | Age: 38
End: 2023-08-21
Payer: COMMERCIAL

## 2023-08-21 ENCOUNTER — LAB (OUTPATIENT)
Dept: LAB | Facility: HOSPITAL | Age: 38
End: 2023-08-21
Payer: COMMERCIAL

## 2023-08-21 DIAGNOSIS — E78.00 PURE HYPERCHOLESTEROLEMIA: ICD-10-CM

## 2023-08-21 DIAGNOSIS — E78.00 PURE HYPERCHOLESTEROLEMIA: Primary | ICD-10-CM

## 2023-08-21 LAB
ALBUMIN SERPL-MCNC: 4.1 G/DL (ref 3.5–5.2)
ALP SERPL-CCNC: 62 U/L (ref 39–117)
ALT SERPL W P-5'-P-CCNC: 29 U/L (ref 1–33)
ANION GAP SERPL CALCULATED.3IONS-SCNC: 13 MMOL/L (ref 5–15)
AST SERPL-CCNC: 21 U/L (ref 1–32)
BILIRUB CONJ SERPL-MCNC: <0.2 MG/DL (ref 0–0.3)
BILIRUB INDIRECT SERPL-MCNC: NORMAL MG/DL
BILIRUB SERPL-MCNC: 0.3 MG/DL (ref 0–1.2)
BUN SERPL-MCNC: 11 MG/DL (ref 6–20)
BUN/CREAT SERPL: 15.3 (ref 7–25)
CALCIUM SPEC-SCNC: 10.3 MG/DL (ref 8.6–10.5)
CHLORIDE SERPL-SCNC: 104 MMOL/L (ref 98–107)
CHOLEST SERPL-MCNC: 152 MG/DL (ref 0–200)
CO2 SERPL-SCNC: 22 MMOL/L (ref 22–29)
CREAT SERPL-MCNC: 0.72 MG/DL (ref 0.57–1)
DEPRECATED RDW RBC AUTO: 45.1 FL (ref 37–54)
EGFRCR SERPLBLD CKD-EPI 2021: 110.6 ML/MIN/1.73
ERYTHROCYTE [DISTWIDTH] IN BLOOD BY AUTOMATED COUNT: 13.5 % (ref 12.3–15.4)
GLUCOSE SERPL-MCNC: 117 MG/DL (ref 65–99)
HCT VFR BLD AUTO: 36.9 % (ref 34–46.6)
HDLC SERPL-MCNC: 47 MG/DL (ref 40–60)
HGB BLD-MCNC: 12.2 G/DL (ref 12–15.9)
LDLC SERPL CALC-MCNC: 80 MG/DL (ref 0–100)
LDLC/HDLC SERPL: 1.64 {RATIO}
MCH RBC QN AUTO: 30.3 PG (ref 26.6–33)
MCHC RBC AUTO-ENTMCNC: 33.1 G/DL (ref 31.5–35.7)
MCV RBC AUTO: 91.8 FL (ref 79–97)
PLATELET # BLD AUTO: 379 10*3/MM3 (ref 140–450)
PMV BLD AUTO: 10.8 FL (ref 6–12)
POTASSIUM SERPL-SCNC: 4.6 MMOL/L (ref 3.5–5.2)
PROT SERPL-MCNC: 6.9 G/DL (ref 6–8.5)
RBC # BLD AUTO: 4.02 10*6/MM3 (ref 3.77–5.28)
SODIUM SERPL-SCNC: 139 MMOL/L (ref 136–145)
T3 SERPL-MCNC: 152 NG/DL (ref 80–200)
T4 FREE SERPL-MCNC: 1.03 NG/DL (ref 0.93–1.7)
TRIGL SERPL-MCNC: 140 MG/DL (ref 0–150)
TSH SERPL DL<=0.05 MIU/L-ACNC: 1.97 UIU/ML (ref 0.27–4.2)
VLDLC SERPL-MCNC: 25 MG/DL (ref 5–40)
WBC NRBC COR # BLD: 11.54 10*3/MM3 (ref 3.4–10.8)

## 2023-08-21 PROCEDURE — 80076 HEPATIC FUNCTION PANEL: CPT

## 2023-08-21 PROCEDURE — 85027 COMPLETE CBC AUTOMATED: CPT

## 2023-08-21 PROCEDURE — 84443 ASSAY THYROID STIM HORMONE: CPT

## 2023-08-21 PROCEDURE — 36415 COLL VENOUS BLD VENIPUNCTURE: CPT

## 2023-08-21 PROCEDURE — 80048 BASIC METABOLIC PNL TOTAL CA: CPT

## 2023-08-21 PROCEDURE — 84439 ASSAY OF FREE THYROXINE: CPT

## 2023-08-21 PROCEDURE — 84480 ASSAY TRIIODOTHYRONINE (T3): CPT

## 2023-08-21 PROCEDURE — 80061 LIPID PANEL: CPT

## 2024-08-15 NOTE — PROGRESS NOTES
Sleep Clinic Follow Up Note    Chief Complaint  Sleep Apnea, Follow-up, and Snoring    Subjective     History of Present Illness (from previous encounter on 10/28/2021 with Ms. Prasad):  Alessandra Villa is a 36 y.o. female here for follow-up of sleep apnea.  Patient was last seen 10/29/2020.  Patient does not feel she has sleep apnea and is going to pay cash pay for home sleep study.  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  Patient states CPAP is very uncomfortable for her to wear and does sleep much better when she does not wear it.  She has an [place] score of 1/24.  She is going to continue use until her study. (End Copied Text)    Interval History:  Alessandra Villa is a 39 y.o. female who presents for follow-up.  Patient was last seen by Ms. Prasad on 10/28/2021.  At that time a home sleep study was ordered for reevaluation.  This has not yet been obtained.  She had been using a PAP device at that time. She has lost a significant weight loss with [place] since she was last seen. She snores occasionally but this is much improved. She sleeps about 8-10 hours and wakes 1-2 times per night. She gets back to sleep quickly usually.       Further details are as follows:    [place] Scale is: 1/24      Weight: 179 lb    Weight change in the last year:  loss: 40 lbs +/-    The patient's relevant past medical, surgical, family, and social history reviewed and updated in Epic as appropriate.    PMH:    Past Medical History:   Diagnosis Date    Acid reflux     Arthritis     Chicken pox     Diabetes mellitus     Hyperlipidemia     Kidney stone 2016    Snoring      Past Surgical History:   Procedure Laterality Date    KIDNEY STONE SURGERY  2016     OB History    No obstetric history on file.       Allergies   Allergen Reactions    Dapagliflozin Diarrhea    Adhesive Tape Hives     ACTUALLY BLISTERS WITH HEART MONITOR       MEDS:  Prior to  Admission medications    Medication Sig Start Date End Date Taking? Authorizing Provider   albuterol sulfate  (90 Base) MCG/ACT inhaler INHALE 2 PUFFS BY MOUTH EVERY SIX HOURS AS NEEDED 7/25/19   Sally Sorenson MD   albuterol sulfate  (90 Base) MCG/ACT inhaler Inhale 2 puffs Every 4 (Four) Hours As Needed for Wheezing. 10/7/20   Jory Miller PA   atorvastatin (LIPITOR) 40 MG tablet atorvastatin 40 mg tablet   Bedtime 11/23/16   Sally Sorenson MD   azithromycin (ZITHROMAX) 250 MG tablet 2 tabs day 1, 1 tab days 2-5 10/7/20   Jory Miller PA   brompheniramine-pseudoephedrine-DM 30-2-10 MG/5ML syrup Take 5 mL by mouth 4 (Four) Times a Day As Needed for Allergies. 10/1/20   Naun Celestin MD   glipizide (GLUCOTROL XL) 5 MG ER tablet Every 12 (Twelve) Hours.    Sally Sorenson MD   lisinopril (PRINIVIL,ZESTRIL) 2.5 MG tablet Take 2.5 mg by mouth Daily. 7/7/19   Sally Sorenson MD   MAGNESIUM CITRATE PO Take  by mouth 2 (Two) Times a Day.    Sally Sorenson MD   metFORMIN ER (GLUCOPHAGE-XR) 500 MG 24 hr tablet metformin  mg tablet,extended release 24 hr   Two times a day    Sally Sorenson MD   montelukast (SINGULAIR) 10 MG tablet Take 10 mg by mouth Daily. 7/25/19   Sally Sorenson MD   Multiple Vitamins-Minerals (MULTIVITAMIN ADULT PO) Take  by mouth.    Sally Sorenson MD   norgestimate-ethinyl estradiol (TRI-SPRINTEC) 0.18/0.215/0.25 MG-35 MCG per tablet Tri-Sprintec (28) 0.18 mg(7)/0.215 mg(7)/0.25 mg(7)-35 mcg tablet    Sally Sorenson MD   Omega-3 1000 MG capsule [place] 3    Sally Sorenson MD   pantoprazole (PROTONIX) 40 MG EC tablet TAKE 1 TABLET BY MOUTH ONE TIME A DAY. (need appointment/labs for additional refills) 7/5/19   Sally Sorenson MD   PAZEO 0.7 % solution INSTILL 1 DROP IN EACH EYE EVERY TWELVE HOURS AS NEEDED 7/25/19   Provider, MD Sally   predniSONE (DELTASONE) 20 MG tablet 3 tabs for 2 days, 2 tabs  "for 2 days, 1 tab for 2 days. 10/7/20   Jory Miller PA   Semaglutide,0.25 or 0.5MG/DOS, (OZEMPIC, 0.25 OR 0.5 MG/DOSE,) 2 MG/1.5ML solution pen-injector 0.5 mg.    ProviderSally MD   SITagliptin (JANUVIA) 100 MG tablet Januvia 100 mg tablet   Take 1 tab daily    ProviderSally MD   SYMBICORT 80-4.5 MCG/ACT inhaler Inhale 2 puffs 2 (Two) Times a Day. 7/25/19   ProviderSally MD         FH:  Family History   Problem Relation Age of Onset    Obesity Mother     Sleep apnea Mother     Stroke Father     Obesity Father     Asthma Father     Obesity Brother     Asthma Paternal Aunt     Asthma Paternal Uncle     Diabetes Maternal Grandmother     Obesity Maternal Grandmother     Thyroid disease Maternal Grandmother     Diabetes Paternal Grandmother     Obesity Paternal Grandmother     Breast cancer Neg Hx     Ovarian cancer Neg Hx        Objective   Vital Signs:  /70   Pulse 95   Temp 97.3 °F (36.3 °C) (Temporal)   Ht 161.3 cm (63.5\")   Wt 81.5 kg (179 lb 11.2 oz)   SpO2 99%   BMI 31.33 kg/m²       Patient's (Body mass index is 31.33 kg/m².) indicates that they are obese (BMI >30) with health related conditions that include obstructive sleep apnea . Weight is improving with treatment. BMI is is above average; BMI management plan is completed. We discussed portion control and increasing exercise.          Physical Exam  Vitals reviewed.   Constitutional:       Appearance: Normal appearance.   HENT:      Head: Normocephalic and atraumatic.      Nose: Nose normal.      Mouth/Throat:      Mouth: Mucous membranes are moist.   Cardiovascular:      Rate and Rhythm: Normal rate and regular rhythm.      Heart sounds: No murmur heard.     No friction rub. No gallop.   Pulmonary:      Effort: Pulmonary effort is normal. No respiratory distress.      Breath sounds: Normal breath sounds. No wheezing or rhonchi.   Neurological:      Mental Status: She is alert and oriented to person, place, and time. "   Psychiatric:         Behavior: Behavior normal.           Result Review :              Assessment and Plan  Alessandra Villa is a 39 y.o. female returns for follow-up.  The patient was last seen on 10/28/2021.  At that time she had been using her PAP device but this was uncomfortable for her.  She noted that there was too much pressure.  She has lost a significant amount of weight since she was last seen. She had mild sleep apnea on her test with an index of 6.8/h.  We will obtain a home sleep test for reevaluation given her significant weight loss.     Diagnoses and all orders for this visit:    1. Obstructive sleep apnea, adult (Primary)  -     Home Sleep Study; Future    2. Weight loss  -     Home Sleep Study; Future    3. Obesity (BMI 30.0-34.9)                    Follow Up  Return for Follow up after study.  Patient was given instructions and counseling regarding her condition or for health maintenance advice. Please see specific information pulled into the AVS if appropriate.       MELE Lawrence, ACNP-BC  Pulmonology, Critical Care, and Sleep Medicine

## 2024-08-20 ENCOUNTER — OFFICE VISIT (OUTPATIENT)
Dept: SLEEP MEDICINE | Age: 39
End: 2024-08-20
Payer: COMMERCIAL

## 2024-08-20 VITALS
TEMPERATURE: 97.3 F | SYSTOLIC BLOOD PRESSURE: 110 MMHG | HEART RATE: 95 BPM | WEIGHT: 179.7 LBS | DIASTOLIC BLOOD PRESSURE: 70 MMHG | OXYGEN SATURATION: 99 % | HEIGHT: 64 IN | BODY MASS INDEX: 30.68 KG/M2

## 2024-08-20 DIAGNOSIS — G47.33 OBSTRUCTIVE SLEEP APNEA, ADULT: Primary | ICD-10-CM

## 2024-08-20 DIAGNOSIS — E66.9 OBESITY (BMI 30.0-34.9): ICD-10-CM

## 2024-08-20 DIAGNOSIS — R63.4 WEIGHT LOSS: ICD-10-CM

## 2024-08-20 PROCEDURE — 99213 OFFICE O/P EST LOW 20 MIN: CPT | Performed by: NURSE PRACTITIONER

## 2024-08-20 RX ORDER — TIRZEPATIDE 5 MG/.5ML
INJECTION, SOLUTION SUBCUTANEOUS
COMMUNITY

## 2024-10-10 ENCOUNTER — HOSPITAL ENCOUNTER (OUTPATIENT)
Dept: SLEEP MEDICINE | Facility: HOSPITAL | Age: 39
End: 2024-10-10
Payer: COMMERCIAL

## 2024-10-10 VITALS — WEIGHT: 179 LBS | BODY MASS INDEX: 31.71 KG/M2 | HEIGHT: 63 IN

## 2024-10-10 DIAGNOSIS — R63.4 WEIGHT LOSS: ICD-10-CM

## 2024-10-10 DIAGNOSIS — G47.33 OBSTRUCTIVE SLEEP APNEA, ADULT: ICD-10-CM

## 2024-10-10 PROCEDURE — 95800 SLP STDY UNATTENDED: CPT

## 2024-10-11 DIAGNOSIS — R06.83 SNORING: ICD-10-CM

## 2024-10-11 DIAGNOSIS — G47.33 OSA (OBSTRUCTIVE SLEEP APNEA): Primary | ICD-10-CM

## 2024-10-11 PROCEDURE — 95800 SLP STDY UNATTENDED: CPT | Performed by: INTERNAL MEDICINE

## 2024-10-18 ENCOUNTER — TELEPHONE (OUTPATIENT)
Dept: SLEEP MEDICINE | Facility: CLINIC | Age: 39
End: 2024-10-18
Payer: COMMERCIAL

## 2025-01-15 NOTE — PROGRESS NOTES
Sleep Clinic Follow Up Note    Chief Complaint  Sleeping Problem, Sleep Apnea, and Follow-up    Subjective     History of Present Illness (from previous encounter on 8/28/2024):  Alessandra Villa is a 39 y.o. female returns for follow-up.  The patient was last seen on 10/28/2021.  At that time she had been using her PAP device but this was uncomfortable for her.  She noted that there was too much pressure.  She has lost a significant amount of weight since she was last seen. She had mild sleep apnea on her test with an index of 6.8/h.  We will obtain a home sleep test for reevaluation given her significant weight loss. (End copied text).    -A home sleep test was obtained on 10/11/2024 revealing mild obstructive sleep apnea with an AHI of 9.0/h.    Interval History:  Alessandra Villa is a 39 y.o. female returns for follow up and compliance of PAP therapy. The patient was last seen on 8/20/2024 by me. Overall the patient feels poor with regard to therapy. She has had difficulty with sinus drainage, possibly allergies, making use of pap therapy difficult.  She also had a sinus infection.  The device appears to be working appropriately. On average the patient sleeps 8-9 hours per night. The patient wakes 2-3 times per night.     The patient reports the following changes to their medical and medication history since they were last seen:  Started Antibiotics    Further details are as follows:      [place] Scale is (out of 24): Total score: 4     Weight:  Current Weight: 157 lb    Weight change in the last year:  loss: 60 lbs    The patient's relevant past medical, surgical, family, and social history reviewed and updated in Epic as appropriate.    PMH:    Past Medical History:   Diagnosis Date    Acid reflux     Arthritis     Chicken pox     Diabetes mellitus     Hyperlipidemia     Kidney stone 2016    Snoring      Past Surgical History:   Procedure Laterality Date    KIDNEY STONE SURGERY  2016     OB History    No obstetric  history on file.       Allergies   Allergen Reactions    Dapagliflozin Diarrhea    Adhesive Tape Hives     ACTUALLY BLISTERS WITH HEART MONITOR       MEDS:  Prior to Admission medications    Medication Sig Start Date End Date Taking? Authorizing Provider   albuterol sulfate  (90 Base) MCG/ACT inhaler Inhale 2 puffs Every 4 (Four) Hours As Needed for Wheezing. 9/16/24   Norma Mcclure APRN   atorvastatin (LIPITOR) 40 MG tablet atorvastatin 40 mg tablet   Bedtime 11/23/16   Sally Sorenson MD   azithromycin (ZITHROMAX) 250 MG tablet Take 2 tablets today followed by 1 tablet daily for 4 days 9/16/24   Norma Mcclure APRN   brompheniramine-pseudoephedrine-DM 30-2-10 MG/5ML syrup Take 5 mL by mouth 4 (Four) Times a Day As Needed for Allergies. 10/1/20   Naun Celestin MD   cetirizine (zyrTEC) 10 MG tablet     Sally Sorenson MD   FLUoxetine (PROzac) 10 MG tablet  9/13/24   Sally Sorenson MD   glipizide (GLUCOTROL XL) 2.5 MG 24 hr tablet  9/14/24   Sally Sorenson MD   lisinopril (PRINIVIL,ZESTRIL) 2.5 MG tablet Take 2.5 mg by mouth Daily. 7/7/19   Sally Sorenson MD   MAGNESIUM CITRATE PO Take  by mouth 2 (Two) Times a Day.    Sally Sorenson MD   metFORMIN (GLUCOPHAGE) 1000 MG tablet Daily.    Sally Sorenson MD   montelukast (SINGULAIR) 10 MG tablet Take 10 mg by mouth Daily. 7/25/19   Sally Sorenson MD Mounjaro 5 MG/0.5ML solution pen-injector pen as directed Subcutaneous    Sally Sorenson MD   Multiple Vitamins-Minerals (MULTIVITAMIN ADULT PO) Take  by mouth.    Sally Sorenson MD   norgestimate-ethinyl estradiol (TRI-SPRINTEC) 0.18/0.215/0.25 MG-35 MCG per tablet Tri-Sprintec (28) 0.18 mg(7)/0.215 mg(7)/0.25 mg(7)-35 mcg tablet    Sally Sorenson MD   Omega-3 1000 MG capsule [place] 3    Sally Sorenson MD   pantoprazole (PROTONIX) 40 MG EC tablet TAKE 1 TABLET BY MOUTH ONE TIME A DAY. (need appointment/labs for additional refills)  "7/5/19   ProviderSally MD PAZEO 0.7 % solution INSTILL 1 DROP IN EACH EYE EVERY TWELVE HOURS AS NEEDED 7/25/19   Sally Sorenson MD   SYMBICORT 80-4.5 MCG/ACT inhaler Inhale 2 puffs 2 (Two) Times a Day. 7/25/19   Sally Sorenson MD         FH:  Family History   Problem Relation Age of Onset    Obesity Mother     Sleep apnea Mother     Stroke Father     Obesity Father     Asthma Father     Obesity Brother     Asthma Paternal Aunt     Asthma Paternal Uncle     Diabetes Maternal Grandmother     Obesity Maternal Grandmother     Thyroid disease Maternal Grandmother     Diabetes Paternal Grandmother     Obesity Paternal Grandmother     Breast cancer Neg Hx     Ovarian cancer Neg Hx        Objective   Vital Signs:  /70 (BP Location: Left arm, Patient Position: Sitting, Cuff Size: Adult)   Pulse 99   Temp 97.5 °F (36.4 °C) (Temporal)   Ht 160 cm (62.99\")   Wt 71.3 kg (157 lb 1.6 oz)   SpO2 98%   BMI 27.84 kg/m²              Physical Exam  Vitals reviewed.   Constitutional:       Appearance: Normal appearance.   HENT:      Head: Normocephalic and atraumatic.      Nose: Nose normal.      Mouth/Throat:      Mouth: Mucous membranes are moist.   Cardiovascular:      Rate and Rhythm: Normal rate and regular rhythm.      Heart sounds: No murmur heard.     No friction rub. No gallop.   Pulmonary:      Effort: Pulmonary effort is normal. No respiratory distress.      Breath sounds: Normal breath sounds. No wheezing or rhonchi.   Neurological:      Mental Status: She is alert and oriented to person, place, and time.   Psychiatric:         Behavior: Behavior normal.               Result Review :           PAP Report:  AHI: 0.1/h  Days of Usage: 22/30 (73%)  Number of Days Greater than 4 hours: 13/30 (43%)  Average time (days used): 4 hours 15 minutes  95th Percentile Pressure: 8.6 cmH2O  95th percentile leaks: 0.6 L/min  Settings: Auto CPAP-8/18 cm H2O, EPR full-time, EPR level 1, response " standard       Assessment and Plan  Alessandra Villa is a 39 y.o. female who returns for follow-up and compliance of PAP therapy.  The Pap report has been reviewed.  Overall usage is at 73% with compliance of 43%.  The patient averaged 4 hours and 15 minutes of therapy.  Sleep apnea is well-controlled with an AHI of 0.1/h.  She has a history of mild obstructive sleep apnea with an initial AHI of 9.0/h.  I have encouraged increased usage.  She has lost a significant amount of weight, and continues to do so.    I will refill the patient's supplies, and I have asked her to return for follow-up and recheck in approximately 4 months.  With her ongoing weight loss we may need to obtain another HST to reevaluate sleep apnea, especially given her diagnosis of mild sleep apnea.    Diagnoses and all orders for this visit:    1. DAVIN (obstructive sleep apnea) (Primary)  -     PAP Therapy    2. Obesity (BMI 30.0-34.9)           The patient continues to use and benefit from PAP therapy.    1. The patient was counseled regarding multimodal approach with healthy nutrition, healthy sleep, regular physical activity, social activities, counseling, and medications. Encouraged to practice lateral sleep position. Avoid alcohol and sedatives close to bedtime.     2.  We will refill supplies x1 year.  Return to clinic 1 year or sooner if symptoms warrant. I have reviewed the results of my evaluation and impression and discussed my recommendations in detail with the patient.           Follow Up  Return in about 4 months (around 5/20/2025) for Recheck.  Patient was given instructions and counseling regarding her condition or for health maintenance advice. Please see specific information pulled into the AVS if appropriate.       MELE Lawrence, ACNP-BC  Pulmonology, Critical Care, and Sleep Medicine

## 2025-01-20 ENCOUNTER — OFFICE VISIT (OUTPATIENT)
Dept: SLEEP MEDICINE | Age: 40
End: 2025-01-20
Payer: COMMERCIAL

## 2025-01-20 VITALS
BODY MASS INDEX: 27.84 KG/M2 | HEART RATE: 99 BPM | HEIGHT: 63 IN | SYSTOLIC BLOOD PRESSURE: 120 MMHG | WEIGHT: 157.1 LBS | OXYGEN SATURATION: 98 % | TEMPERATURE: 97.5 F | DIASTOLIC BLOOD PRESSURE: 70 MMHG

## 2025-01-20 DIAGNOSIS — G47.33 OSA (OBSTRUCTIVE SLEEP APNEA): Primary | ICD-10-CM

## 2025-01-20 DIAGNOSIS — E66.811 OBESITY (BMI 30.0-34.9): ICD-10-CM

## 2025-01-20 PROCEDURE — 99213 OFFICE O/P EST LOW 20 MIN: CPT | Performed by: NURSE PRACTITIONER

## 2025-04-01 ENCOUNTER — HOSPITAL ENCOUNTER (OUTPATIENT)
Dept: CT IMAGING | Facility: HOSPITAL | Age: 40
Discharge: HOME | End: 2025-04-01
Admitting: INTERNAL MEDICINE
Payer: COMMERCIAL

## 2025-04-01 ENCOUNTER — TRANSCRIBE ORDERS (OUTPATIENT)
Dept: ADMINISTRATIVE | Facility: HOSPITAL | Age: 40
End: 2025-04-01
Payer: COMMERCIAL

## 2025-04-01 DIAGNOSIS — N20.0 RENAL STONE: ICD-10-CM

## 2025-04-01 DIAGNOSIS — N20.0 RENAL STONE: Primary | ICD-10-CM

## 2025-04-01 PROCEDURE — 74176 CT ABD & PELVIS W/O CONTRAST: CPT

## 2025-05-15 NOTE — PROGRESS NOTES
Sleep Clinic Follow Up Note    Chief Complaint  Sleep Apnea    Subjective     History of Present Illness (from previous encounter on 1/20/2025):  Alessandra iVlla is a 39 y.o. female who returns for follow-up and compliance of PAP therapy.  The Pap report has been reviewed.  Overall usage is at 73% with compliance of 43%.  The patient averaged 4 hours and 15 minutes of therapy.  Sleep apnea is well-controlled with an AHI of 0.1/h.  She has a history of mild obstructive sleep apnea with an initial AHI of 9.0/h.  I have encouraged increased usage.  She has lost a significant amount of weight, and continues to do so.     I will refill the patient's supplies, and I have asked her to return for follow-up and recheck in approximately 4 months.  With her ongoing weight loss we may need to obtain another HST to reevaluate sleep apnea, especially given her diagnosis of mild sleep apnea. (End copied text).    Interval History:  Alessandra Villa is a 39 y.o. female returns for follow up and compliance of PAP therapy. The patient was last seen on 1/20/2025 by me. Overall the patient feels poor with regard to therapy. The device appears to be working appropriately. On average the patient sleeps 8-12 hours per night. The patient wakes 0-1 times per night. She has lost some more weight since her last visit. She wakes and has to remove the mask.     The patient reports the following changes to their medical and medication history since they were last seen:  none    Further details are as follows:      [place] Scale is (out of 24): Total score: 1     Weight:  Current Weight: 149 lb      The patient's relevant past medical, surgical, family, and social history reviewed and updated in Epic as appropriate.    PMH:    Past Medical History:   Diagnosis Date    Acid reflux     Arthritis     Chicken pox     Diabetes mellitus     Hyperlipidemia     Kidney stone 2016    Snoring      Past Surgical History:   Procedure Laterality Date    KIDNEY  STONE SURGERY  2016     OB History    No obstetric history on file.       Allergies   Allergen Reactions    Dapagliflozin Diarrhea    Adhesive Tape Hives     ACTUALLY BLISTERS WITH HEART MONITOR       MEDS:  Prior to Admission medications    Medication Sig Start Date End Date Taking? Authorizing Provider   albuterol sulfate  (90 Base) MCG/ACT inhaler Inhale 2 puffs Every 4 (Four) Hours As Needed for Wheezing. 9/16/24   Norma Mcclure APRN   atorvastatin (LIPITOR) 40 MG tablet atorvastatin 40 mg tablet   Bedtime 11/23/16   Sally Sorenson MD   azithromycin (ZITHROMAX) 250 MG tablet Take 2 tablets today followed by 1 tablet daily for 4 days 9/16/24   Norma Mcclure APRN   brompheniramine-pseudoephedrine-DM 30-2-10 MG/5ML syrup Take 5 mL by mouth 4 (Four) Times a Day As Needed for Allergies. 10/1/20   Naun Celestin MD   cetirizine (zyrTEC) 10 MG tablet     Sally Sorenson MD   FLUoxetine (PROzac) 10 MG tablet  9/13/24   Sally Sorenson MD   glipizide (GLUCOTROL XL) 2.5 MG 24 hr tablet  9/14/24   Sally Sorenson MD   lisinopril (PRINIVIL,ZESTRIL) 2.5 MG tablet Take 2.5 mg by mouth Daily. 7/7/19   Sally Sorenson MD   MAGNESIUM CITRATE PO Take  by mouth 2 (Two) Times a Day.    Sally Sorenson MD   metFORMIN (GLUCOPHAGE) 1000 MG tablet Daily.    Sally Sorenson MD   montelukast (SINGULAIR) 10 MG tablet Take 10 mg by mouth Daily. 7/25/19   Sally Sorenson MD Mounjaro 5 MG/0.5ML solution pen-injector pen as directed Subcutaneous    Sally Sorenson MD   Multiple Vitamins-Minerals (MULTIVITAMIN ADULT PO) Take  by mouth.    Sally Sorenson MD   norgestimate-ethinyl estradiol (TRI-SPRINTEC) 0.18/0.215/0.25 MG-35 MCG per tablet Tri-Sprintec (28) 0.18 mg(7)/0.215 mg(7)/0.25 mg(7)-35 mcg tablet    Sally Sorenson MD   Omega-3 1000 MG capsule [place] 3    Sally Sorenson MD   pantoprazole (PROTONIX) 40 MG EC tablet TAKE 1 TABLET BY MOUTH ONE TIME A  "DAY. (need appointment/labs for additional refills) 7/5/19   Sally Sorenson MD   PAZEO 0.7 % solution INSTILL 1 DROP IN EACH EYE EVERY TWELVE HOURS AS NEEDED 7/25/19   Sally Sorenson MD   SYMBICORT 80-4.5 MCG/ACT inhaler Inhale 2 puffs 2 (Two) Times a Day. 7/25/19   Sally Sorenson MD         FH:  Family History   Problem Relation Age of Onset    Obesity Mother     Sleep apnea Mother     Stroke Father     Obesity Father     Asthma Father     Obesity Brother     Asthma Paternal Aunt     Asthma Paternal Uncle     Diabetes Maternal Grandmother     Obesity Maternal Grandmother     Thyroid disease Maternal Grandmother     Diabetes Paternal Grandmother     Obesity Paternal Grandmother     Breast cancer Neg Hx     Ovarian cancer Neg Hx        Objective   Vital Signs:  /76   Pulse 81   Temp 97.5 °F (36.4 °C) (Infrared)   Ht 160 cm (63\")   Wt 67.6 kg (149 lb)   SpO2 97%   BMI 26.39 kg/m²     Patient's (Body mass index is 26.39 kg/m².) indicates that they are overweight (BMI 25-29.9)          Physical Exam  Vitals reviewed.   Constitutional:       Appearance: Normal appearance.   HENT:      Head: Normocephalic and atraumatic.      Nose: Nose normal.      Mouth/Throat:      Mouth: Mucous membranes are moist.   Cardiovascular:      Rate and Rhythm: Normal rate and regular rhythm.      Heart sounds: No murmur heard.     No friction rub. No gallop.   Pulmonary:      Effort: Pulmonary effort is normal. No respiratory distress.      Breath sounds: Normal breath sounds. No wheezing or rhonchi.   Neurological:      Mental Status: She is alert and oriented to person, place, and time.   Psychiatric:         Behavior: Behavior normal.               Result Review :           PAP Report:  AHI: 0.5/h  Days of Usage: 9/30 (30%)  Number of Days Greater than 4 hours: 3%  Average time (days used): 2 hours 27 minutes  95th Percentile Pressure: 9.2 cmH2O  95th percentile leaks: 3.1 L/min  Settings: Auto CPAP-8/18 " cm H2O, EPR full-time, EPR level 1, response standard.       Assessment and Plan  Alessandra Villa is a 39 y.o. female who returns for follow-up and compliance of PAP therapy.  The Pap report has been reviewed.  Overall usage is at 30% with compliance of 3%.  The patient averages 2 hours and 27 minutes of therapy.  Sleep apnea is well-controlled with an AHI of 0.5/H.    I will refill the patient's supplies today and encourage increased usage.     We will obtain a home sleep test for reevaluation of sleep apnea considering the patient's significant weight loss.  Patient has a history of mild obstructive sleep apnea with an initial AHI of 9.0/H.  Patient has a CDL and drives a schoolbus.  She will need verification of absence of sleep apnea in order to discontinue PAP therapy.  I have discussed alternatives including dental appliance and surgical consult with otolaryngology for UPPP/uvulectomy.    Diagnoses and all orders for this visit:    1. DAVIN (obstructive sleep apnea) (Primary)  -     PAP Therapy  -     Home Sleep Study; Future    2. Weight loss  -     Home Sleep Study; Future    3. Overweight with body mass index (BMI) 25.0-29.9         The patient continues to use and benefit from PAP therapy.    1. The patient was counseled regarding multimodal approach with healthy nutrition, healthy sleep, regular physical activity, social activities, counseling, and medications. Encouraged to practice lateral sleep position. Avoid alcohol and sedatives close to bedtime.     2.  We will refill supplies x1 year.  Return to clinic 1 year or sooner if symptoms warrant. I have reviewed the results of my evaluation and impression and discussed my recommendations in detail with the patient.           Follow Up  Return for Follow up after study.  Patient was given instructions and counseling regarding her condition or for health maintenance advice. Please see specific information pulled into the AVS if appropriate.       Roby LOUIS  MELE [place], ACNP-BC  Pulmonology, Critical Care, and Sleep Medicine

## 2025-05-20 ENCOUNTER — OFFICE VISIT (OUTPATIENT)
Dept: GASTROENTEROLOGY | Facility: CLINIC | Age: 40
End: 2025-05-20
Payer: COMMERCIAL

## 2025-05-20 ENCOUNTER — OFFICE VISIT (OUTPATIENT)
Dept: SLEEP MEDICINE | Age: 40
End: 2025-05-20
Payer: COMMERCIAL

## 2025-05-20 ENCOUNTER — LAB (OUTPATIENT)
Dept: LAB | Facility: HOSPITAL | Age: 40
End: 2025-05-20
Payer: COMMERCIAL

## 2025-05-20 VITALS
HEIGHT: 63 IN | TEMPERATURE: 97.3 F | WEIGHT: 150.2 LBS | OXYGEN SATURATION: 98 % | HEART RATE: 70 BPM | DIASTOLIC BLOOD PRESSURE: 84 MMHG | BODY MASS INDEX: 26.61 KG/M2 | SYSTOLIC BLOOD PRESSURE: 128 MMHG

## 2025-05-20 VITALS
OXYGEN SATURATION: 97 % | BODY MASS INDEX: 26.4 KG/M2 | DIASTOLIC BLOOD PRESSURE: 76 MMHG | TEMPERATURE: 97.5 F | HEART RATE: 81 BPM | SYSTOLIC BLOOD PRESSURE: 100 MMHG | HEIGHT: 63 IN | WEIGHT: 149 LBS

## 2025-05-20 DIAGNOSIS — R74.8 ELEVATED LIVER ENZYMES: ICD-10-CM

## 2025-05-20 DIAGNOSIS — R10.11 RUQ ABDOMINAL PAIN: ICD-10-CM

## 2025-05-20 DIAGNOSIS — E66.3 OVERWEIGHT WITH BODY MASS INDEX (BMI) 25.0-29.9: ICD-10-CM

## 2025-05-20 DIAGNOSIS — G47.33 OSA (OBSTRUCTIVE SLEEP APNEA): Primary | ICD-10-CM

## 2025-05-20 DIAGNOSIS — R14.0 BLOATING: ICD-10-CM

## 2025-05-20 DIAGNOSIS — R63.4 WEIGHT LOSS: ICD-10-CM

## 2025-05-20 DIAGNOSIS — K21.9 GASTROESOPHAGEAL REFLUX DISEASE, UNSPECIFIED WHETHER ESOPHAGITIS PRESENT: Primary | ICD-10-CM

## 2025-05-20 LAB
BASOPHILS # BLD AUTO: 0.04 10*3/MM3 (ref 0–0.2)
BASOPHILS NFR BLD AUTO: 0.4 % (ref 0–1.5)
DEPRECATED RDW RBC AUTO: 40.7 FL (ref 37–54)
EOSINOPHIL # BLD AUTO: 0.17 10*3/MM3 (ref 0–0.4)
EOSINOPHIL NFR BLD AUTO: 1.9 % (ref 0.3–6.2)
ERYTHROCYTE [DISTWIDTH] IN BLOOD BY AUTOMATED COUNT: 11.8 % (ref 12.3–15.4)
HBV SURFACE AB SER RIA-ACNC: REACTIVE
HCT VFR BLD AUTO: 38.8 % (ref 34–46.6)
HGB BLD-MCNC: 13.1 G/DL (ref 12–15.9)
IMM GRANULOCYTES # BLD AUTO: 0.02 10*3/MM3 (ref 0–0.05)
IMM GRANULOCYTES NFR BLD AUTO: 0.2 % (ref 0–0.5)
INR PPP: 0.95 (ref 0.89–1.12)
LYMPHOCYTES # BLD AUTO: 2.99 10*3/MM3 (ref 0.7–3.1)
LYMPHOCYTES NFR BLD AUTO: 33.5 % (ref 19.6–45.3)
MCH RBC QN AUTO: 32.4 PG (ref 26.6–33)
MCHC RBC AUTO-ENTMCNC: 33.8 G/DL (ref 31.5–35.7)
MCV RBC AUTO: 96 FL (ref 79–97)
MONOCYTES # BLD AUTO: 0.5 10*3/MM3 (ref 0.1–0.9)
MONOCYTES NFR BLD AUTO: 5.6 % (ref 5–12)
NEUTROPHILS NFR BLD AUTO: 5.21 10*3/MM3 (ref 1.7–7)
NEUTROPHILS NFR BLD AUTO: 58.4 % (ref 42.7–76)
NRBC BLD AUTO-RTO: 0 /100 WBC (ref 0–0.2)
PLATELET # BLD AUTO: 340 10*3/MM3 (ref 140–450)
PMV BLD AUTO: 10.5 FL (ref 6–12)
PROTHROMBIN TIME: 13.3 SECONDS (ref 12.2–15.3)
RBC # BLD AUTO: 4.04 10*6/MM3 (ref 3.77–5.28)
WBC NRBC COR # BLD AUTO: 8.93 10*3/MM3 (ref 3.4–10.8)

## 2025-05-20 PROCEDURE — 86015 ACTIN ANTIBODY EACH: CPT

## 2025-05-20 PROCEDURE — 84478 ASSAY OF TRIGLYCERIDES: CPT

## 2025-05-20 PROCEDURE — 87340 HEPATITIS B SURFACE AG IA: CPT

## 2025-05-20 PROCEDURE — 85610 PROTHROMBIN TIME: CPT

## 2025-05-20 PROCEDURE — 86708 HEPATITIS A ANTIBODY: CPT

## 2025-05-20 PROCEDURE — 86231 EMA EACH IG CLASS: CPT

## 2025-05-20 PROCEDURE — 85025 COMPLETE CBC W/AUTO DIFF WBC: CPT

## 2025-05-20 PROCEDURE — 86706 HEP B SURFACE ANTIBODY: CPT

## 2025-05-20 PROCEDURE — 82728 ASSAY OF FERRITIN: CPT

## 2025-05-20 PROCEDURE — 82172 ASSAY OF APOLIPOPROTEIN: CPT

## 2025-05-20 PROCEDURE — 86364 TISS TRNSGLTMNASE EA IG CLAS: CPT

## 2025-05-20 PROCEDURE — 82977 ASSAY OF GGT: CPT

## 2025-05-20 PROCEDURE — 82465 ASSAY BLD/SERUM CHOLESTEROL: CPT

## 2025-05-20 PROCEDURE — 83540 ASSAY OF IRON: CPT

## 2025-05-20 PROCEDURE — 36415 COLL VENOUS BLD VENIPUNCTURE: CPT

## 2025-05-20 PROCEDURE — 86381 MITOCHONDRIAL ANTIBODY EACH: CPT

## 2025-05-20 PROCEDURE — 84466 ASSAY OF TRANSFERRIN: CPT

## 2025-05-20 PROCEDURE — 82784 ASSAY IGA/IGD/IGG/IGM EACH: CPT

## 2025-05-20 PROCEDURE — 83010 ASSAY OF HAPTOGLOBIN QUANT: CPT

## 2025-05-20 PROCEDURE — 80053 COMPREHEN METABOLIC PANEL: CPT

## 2025-05-20 PROCEDURE — 84165 PROTEIN E-PHORESIS SERUM: CPT

## 2025-05-20 PROCEDURE — 83883 ASSAY NEPHELOMETRY NOT SPEC: CPT

## 2025-05-20 PROCEDURE — 86038 ANTINUCLEAR ANTIBODIES: CPT

## 2025-05-20 NOTE — PROGRESS NOTES
New Patient Consultation     Patient Name: Alessandra Villa  : 1985   MRN: [id number]     Chief Complaint:    Chief Complaint   Patient presents with    Establish Care     PCP Referral due to stomach issues.    Heartburn     Pt states she has a lot of acid reflux and gas pains       History of Present Illness: Alessandra Villa is a 39 y.o. female, PMH includes T2DM, anxiety, GERD, HL, kidney stones, who is here today for a Gastroenterology Consultation to establish care.    Pt notes chronic GERD, on pantoprazole 40mg daily long term. She notes previous failure of omeprazole in the past. She uses Pepto Bismol rarely for breakthrough sx. She does endorse nausea and bilious emesis in the AM infrequently.     Pt reports intermittent upper, RUQ abdominal pain and bloating that lasts anywhere from a few hours to a few days. She recently identified dairy / ice cream to be a particularly aggravating food.     Patient denies associated fever, chills, diarrhea, constipation, hematemesis, dysphagia, hematochezia, melena, weight loss or gain, dysuria, jaundice or bruising.    Patient denies personal or FHx of PUD, H Pylori, gastritis, pancreatitis, colitis, Celiac disease, UC, Crohn's disease, colon or gastric cancers. Paternal grandmother with diverticulitis. Pt denies EtOH, tobacco, illicit substance use. Ibuprofen use a few times per week. No previous abdominal surgeries.     Labs 2025: total bili 0.7, AST 46, ALT 77, alk phos 49    CT A/P w/wo contrast 2024: No hepatic lesion is identified. Bilateral nephrolithiasis without obstruction.     No previous EGD / CSY.     Subjective      Review of Systems:   Review of Systems   Constitutional:  Negative for appetite change, chills, diaphoresis, fatigue, fever, unexpected weight gain and unexpected weight loss.   HENT:  Negative for drooling, facial swelling, mouth sores, rhinorrhea, sore throat, tinnitus, trouble swallowing and voice change.    Eyes: Negative.     Respiratory:  Negative for cough, chest tightness and shortness of breath.    Cardiovascular:  Negative for chest pain.   Gastrointestinal:  Negative for abdominal pain, blood in stool, constipation, diarrhea, nausea, vomiting, GERD and indigestion.   Genitourinary:  Negative for dysuria, flank pain, hematuria and pelvic pain.   Musculoskeletal:  Negative for arthralgias and myalgias.   Skin:  Negative for color change, pallor and rash.   Neurological:  Negative for dizziness, tremors, syncope, weakness and numbness.   Psychiatric/Behavioral:  Negative for hallucinations and sleep disturbance. The patient is not nervous/anxious.    All other systems reviewed and are negative.      Past Medical History:   Past Medical History:   Diagnosis Date    Acid reflux     Arthritis     Chicken pox     Diabetes mellitus     Hyperlipidemia     Kidney stone 2016    Snoring        Past Surgical History:   Past Surgical History:   Procedure Laterality Date    KIDNEY STONE SURGERY  2016       Family History:   Family History   Problem Relation Age of Onset    Obesity Mother     Sleep apnea Mother     Stroke Father     Obesity Father     Asthma Father     Obesity Brother     Asthma Paternal Aunt     Asthma Paternal Uncle     Diabetes Maternal Grandmother     Obesity Maternal Grandmother     Thyroid disease Maternal Grandmother     Diabetes Paternal Grandmother     Obesity Paternal Grandmother     Breast cancer Neg Hx     Ovarian cancer Neg Hx        Social History:   Social History     Socioeconomic History    Marital status: Single   Tobacco Use    Smoking status: Never     Passive exposure: Never    Smokeless tobacco: Never   Vaping Use    Vaping status: Never Used   Substance and Sexual Activity    Alcohol use: No    Drug use: No       Alcohol/Tobacco History:   Social History     Substance and Sexual Activity   Alcohol Use No     Social History     Tobacco Use   Smoking Status Never    Passive exposure: Never   Smokeless  "Tobacco Never       Medications:     Current Outpatient Medications:     albuterol sulfate  (90 Base) MCG/ACT inhaler, Inhale 2 puffs Every 4 (Four) Hours As Needed for Wheezing., Disp: 6.7 g, Rfl: 0    cetirizine (zyrTEC) 10 MG tablet, , Disp: , Rfl:     FLUoxetine (PROzac) 10 MG tablet, , Disp: , Rfl:     lisinopril (PRINIVIL,ZESTRIL) 2.5 MG tablet, Take 1 tablet by mouth Daily., Disp: , Rfl: 5    metFORMIN (GLUCOPHAGE) 1000 MG tablet, Daily., Disp: , Rfl:     montelukast (SINGULAIR) 10 MG tablet, Take 1 tablet by mouth Daily., Disp: , Rfl: 5    Multiple Vitamins-Minerals (MULTIVITAMIN ADULT PO), Take  by mouth., Disp: , Rfl:     norgestimate-ethinyl estradiol (TRI-SPRINTEC) 0.18/0.215/0.25 MG-35 MCG per tablet, Tri-Sprintec (28) 0.18 mg(7)/0.215 mg(7)/0.25 mg(7)-35 mcg tablet, Disp: , Rfl:     pantoprazole (PROTONIX) 40 MG EC tablet, TAKE 1 TABLET BY MOUTH ONE TIME A DAY. (need appointment/labs for additional refills), Disp: , Rfl: 1    Allergies:   Allergies   Allergen Reactions    Dapagliflozin Diarrhea    Adhesive Tape Hives     ACTUALLY BLISTERS WITH HEART MONITOR       Objective     Physical Exam:  Vital Signs:   Vitals:    05/20/25 1413   Height: 160 cm (62.99\")     Body mass index is 26.4 kg/m².     Physical Exam  Vitals and nursing note reviewed.   Constitutional:       Appearance: Normal appearance. She is normal weight. She is not ill-appearing or diaphoretic.   HENT:      Head: Normocephalic and atraumatic.      Right Ear: External ear normal.      Left Ear: External ear normal.      Nose: Nose normal.      Mouth/Throat:      Mouth: Mucous membranes are moist.      Pharynx: Oropharynx is clear.   Eyes:      Conjunctiva/sclera: Conjunctivae normal.      Pupils: Pupils are equal, round, and reactive to light.   Neck:      Thyroid: No thyromegaly.   Cardiovascular:      Rate and Rhythm: Normal rate and regular rhythm.      Pulses: Normal pulses.      Heart sounds: Normal heart sounds.   Pulmonary: "      Effort: Pulmonary effort is normal.      Breath sounds: Normal breath sounds.   Abdominal:      General: Abdomen is flat. Bowel sounds are normal. There is no distension.      Tenderness: There is abdominal tenderness (mild, epigastric).   Musculoskeletal:         General: Normal range of motion.      Cervical back: Normal range of motion and neck supple.   Skin:     General: Skin is warm and dry.   Neurological:      General: No focal deficit present.      Mental Status: She is oriented to person, place, and time.   Psychiatric:         Mood and Affect: Mood normal.         Assessment / Plan      Assessment/Plan:   There are no diagnoses linked to this encounter.     GERD  Upper abdominal pain  Bloating  Elevated liver enzymes   - continue pantoprazole 40mg daily    - pt given GERD diet instructions, advised to avoid GI irritants such as caffeine, carbonation, EtOH, tobacco, chocolate, peppermint, acid-based foods   - previous office notes, hospital records, labs, imaging reports reviewed with patient    - obtain CBC, CMP, celiac panel, JAIME Fibrosure, MARK, anti smooth muscle Ab, mitochondrial Ab, SPEP, IgG/IgM/IgA, Hep A/B serologies, iron profile, ferritin, PT/INR   - obtain HIDA scan   - schedule for EGD   - follow up in clinic after completion of above studies   - call clinic at any time for questions or new / worsened sx    Follow Up:   Return for Next scheduled follow up.    Plan of care reviewed with the patient at the conclusion of today's visit.  Education was provided regarding diagnosis, management, and any prescribed or recommended OTC medications.  Patient verbalized understanding of and agreement with management plan.     NOTE TO PATIENT: The 21st Century Cures Act makes medical notes like these available to patients in the interest of transparency. However, be advised this is a medical document. It is intended as peer to peer communication. It is written in medical language and may contain  abbreviations or verbiage that are unfamiliar. It may appear blunt or direct. Medical documents are intended to carry relevant information, facts as evident, and the clinical opinion of the practitioner.     Time Statement:   Discussed plan of care in detail with patient today. Patient verbally understands and agrees. I have spent 45 minutes reviewing available diagnostics, obtaining history, examining the patient, developing a treatment plan, and educating the patient on disease process and plan of care.    Sara Maradiaga PA-C   [facility] [place] [facility]

## 2025-05-21 LAB
ALBUMIN SERPL ELPH-MCNC: 3.7 G/DL (ref 2.9–4.4)
ALBUMIN SERPL-MCNC: 4.2 G/DL (ref 3.5–5.2)
ALBUMIN/GLOB SERPL: 1.3 {RATIO} (ref 0.7–1.7)
ALBUMIN/GLOB SERPL: 1.5 G/DL
ALP SERPL-CCNC: 55 U/L (ref 39–117)
ALPHA1 GLOB SERPL ELPH-MCNC: 0.2 G/DL (ref 0–0.4)
ALPHA2 GLOB SERPL ELPH-MCNC: 0.8 G/DL (ref 0.4–1)
ALT SERPL W P-5'-P-CCNC: 37 U/L (ref 1–33)
ANA SER QL: NEGATIVE
ANION GAP SERPL CALCULATED.3IONS-SCNC: 9 MMOL/L (ref 5–15)
AST SERPL-CCNC: 27 U/L (ref 1–32)
B-GLOBULIN SERPL ELPH-MCNC: 1.2 G/DL (ref 0.7–1.3)
BILIRUB SERPL-MCNC: 0.4 MG/DL (ref 0–1.2)
BUN SERPL-MCNC: 14 MG/DL (ref 6–20)
BUN/CREAT SERPL: 17.7 (ref 7–25)
CALCIUM SPEC-SCNC: 9.8 MG/DL (ref 8.6–10.5)
CHLORIDE SERPL-SCNC: 102 MMOL/L (ref 98–107)
CO2 SERPL-SCNC: 27 MMOL/L (ref 22–29)
CREAT SERPL-MCNC: 0.79 MG/DL (ref 0.57–1)
EGFRCR SERPLBLD CKD-EPI 2021: 98.3 ML/MIN/1.73
ENDOMYSIUM IGA SER QL: NEGATIVE
FERRITIN SERPL-MCNC: 21.5 NG/ML (ref 13–150)
GAMMA GLOB SERPL ELPH-MCNC: 0.7 G/DL (ref 0.4–1.8)
GLOBULIN SER CALC-MCNC: 2.9 G/DL (ref 2.2–3.9)
GLOBULIN UR ELPH-MCNC: 2.8 GM/DL
GLUCOSE SERPL-MCNC: 87 MG/DL (ref 65–99)
HAV AB SER QL IA: POSITIVE
HBV SURFACE AG SERPL QL IA: NORMAL
IGA SERPL-MCNC: 133 MG/DL (ref 87–352)
IGA1 MFR SER: 140 MG/DL (ref 70–400)
IGG1 SER-MCNC: 715 MG/DL (ref 700–1600)
IGM SERPL-MCNC: 221 MG/DL (ref 40–230)
IRON 24H UR-MRATE: 127 MCG/DL (ref 37–145)
IRON SATN MFR SERPL: 23 % (ref 20–50)
LABORATORY COMMENT REPORT: NORMAL
M PROTEIN SERPL ELPH-MCNC: NORMAL G/DL
MITOCHONDRIA M2 IGG SER-ACNC: <20 UNITS (ref 0–20)
POTASSIUM SERPL-SCNC: 3.7 MMOL/L (ref 3.5–5.2)
PROT SERPL-MCNC: 6.6 G/DL (ref 6–8.5)
PROT SERPL-MCNC: 7 G/DL (ref 6–8.5)
SMA IGG SER-ACNC: 3 UNITS (ref 0–19)
SODIUM SERPL-SCNC: 138 MMOL/L (ref 136–145)
TIBC SERPL-MCNC: 548 MCG/DL (ref 298–536)
TRANSFERRIN SERPL-MCNC: 368 MG/DL (ref 200–360)
TTG IGA SER-ACNC: <2 U/ML (ref 0–3)

## 2025-05-22 LAB
A2 MACROGLOB SERPL-MCNC: 132 MG/DL (ref 110–276)
ALT SERPL W P-5'-P-CCNC: 46 IU/L (ref 0–40)
APO A-I SERPL-MCNC: 150 MG/DL (ref 116–209)
AST SERPL W P-5'-P-CCNC: 28 IU/L (ref 0–40)
BILIRUB SERPL-MCNC: 0.3 MG/DL (ref 0–1.2)
CHOLEST SERPL-MCNC: 318 MG/DL (ref 100–199)
FIBROSIS SCORING:: ABNORMAL
FIBROSIS STAGE SERPL QL: ABNORMAL
GGT SERPL-CCNC: 15 IU/L (ref 0–60)
GLUCOSE SERPL-MCNC: 102 MG/DL (ref 70–99)
HAPTOGLOB SERPL-MCNC: 303 MG/DL (ref 33–278)
LABORATORY COMMENT REPORT: ABNORMAL
LIVER FIBR SCORE SERPL CALC.FIBROSURE: 0.02 (ref 0–0.21)
LIVER STEATOSIS GRADE SERPL QL: ABNORMAL
LIVER STEATOSIS SCORE SERPL: 0.58 (ref 0–0.4)
NASH GRADE SERPL QL: ABNORMAL
NASH INTERPRETATION SERPL-IMP: ABNORMAL
NASH SCORE SERPL: 0.23 (ref 0–0.25)
NASH SCORING: ABNORMAL
STEATOSIS SCORING: ABNORMAL
TEST PERFORMANCE INFO SPEC: ABNORMAL
TEST PERFORMANCE INFO SPEC: ABNORMAL
TRIGL SERPL-MCNC: 160 MG/DL (ref 0–149)

## 2025-05-23 ENCOUNTER — RESULTS FOLLOW-UP (OUTPATIENT)
Dept: GASTROENTEROLOGY | Facility: CLINIC | Age: 40
End: 2025-05-23
Payer: COMMERCIAL

## 2025-05-23 ENCOUNTER — PATIENT ROUNDING (BHMG ONLY) (OUTPATIENT)
Dept: GASTROENTEROLOGY | Facility: CLINIC | Age: 40
End: 2025-05-23
Payer: COMMERCIAL

## 2025-05-23 NOTE — PROGRESS NOTES
Cumed MESSAGE HAS BEEN SENT TO THE PATIENT FOR PATIENT ROUNDING WITH [facility] [place]

## 2025-05-23 NOTE — LETTER
Alessandra Villa  [address] [place] [id number]    May 23, 2025     Dear Ms. Villa:    Below are the results from your recent visit:    Resulted Orders   Anti-Smooth Muscle Antibody Titer   Result Value Ref Range    Smooth Muscle Ab 3 0 - 19 Units      Comment:                       Negative                     0 - 19                   Weak positive               20 - 30                   Moderate to strong positive     >30   Actin Antibodies are found in 52-85% of patients with   autoimmune hepatitis or chronic active hepatitis and   in 22% of patients with primary biliary cirrhosis.   Celiac Disease Panel   Result Value Ref Range    Endomysial IgA Negative Negative    Tissue Transglutaminase IgA <2 0 - 3 U/mL      Comment:                                    Negative        0 -  3                                Weak Positive   4 - 10                                Positive           >10   Tissue Transglutaminase (tTG) has been identified   as the endomysial antigen.  Studies have demonstr-   ated that endomysial IgA antibodies have over 99%   specificity for gluten sensitive enteropathy.    IgA 133 87 - 352 mg/dL   Comprehensive Metabolic Panel   Result Value Ref Range    Glucose 87 65 - 99 mg/dL    BUN 14 6 - 20 mg/dL    Creatinine 0.79 0.57 - 1.00 mg/dL    Sodium 138 136 - 145 mmol/L    Potassium 3.7 3.5 - 5.2 mmol/L    Chloride 102 98 - 107 mmol/L    CO2 27.0 22.0 - 29.0 mmol/L    Calcium 9.8 8.6 - 10.5 mg/dL    Total Protein 7.0 6.0 - 8.5 g/dL    Albumin 4.2 3.5 - 5.2 g/dL    ALT (SGPT) 37 (H) 1 - 33 U/L    AST (SGOT) 27 1 - 32 U/L    Alkaline Phosphatase 55 39 - 117 U/L    Total Bilirubin 0.4 0.0 - 1.2 mg/dL    Globulin 2.8 gm/dL    A/G Ratio 1.5 g/dL    BUN/Creatinine Ratio 17.7 7.0 - 25.0    Anion Gap 9.0 5.0 - 15.0 mmol/L    eGFR 98.3 >60.0 mL/min/1.73   Hepatitis A Antibody, Total   Result Value Ref Range    Hep A Total Ab Positive (A) Negative      Comment:      Comment: The HAV total  antibody assay detects both IgG and IgM  but does not differentiate between them. A negative result  suggests susceptibility to infection. A positive result could  be due to vaccination, previously resolved infection or active  infection. Testing for HAV IgM should be performed if active  HAV infection is suspected. [facility] offers profiles that will  automatically reflex positive HAV total antibody results to  IgM (e.g., panel #[id number] HAV Antibody w/ Rfx).   Hepatitis B Surface Antibody   Result Value Ref Range    Hep B S Ab Reactive    Hepatitis B Surface Antigen   Result Value Ref Range    Hepatitis B Surface Ag Non-Reactive Non-Reactive   Mitochondrial Antibodies, M2   Result Value Ref Range    Mitochondrial Ab <20.0 0.0 - 20.0 Units      Comment:                                      Negative    0.0 - 20.0                                  Equivocal  20.1 - 24.9                                  Positive         >24.9  Mitochondrial (M2) Antibodies are found in 90-96% of  patients with primary biliary cirrhosis.   JAIME Fibrosure Plus   Result Value Ref Range    Fibrosis Score 0.02 0.00 - 0.21    Fibrosis Stage Comment       Comment:                         F0 - No fibrosis    Steatosis Score (Reference) 0.58 (H) 0.00 - 0.40    Steatosis Grade (Reference) Comment       Comment:                      S2 - S3  Moderate to Severe Steatosis                           (Clinically Significant) (%)    JAIME Score (Reference) 0.23 0.00 - 0.25    Jaime Grade (Reference) Comment       Comment:                         N0 - No JAIME    Methodology: Comment       Comment:      The analytes tested are performed by FibroSure-Specific methods.  Not intended for use with other diagnostic considerations.    Alpha 2-Macroglobulins, Qn 132 110 - 276 mg/dL    Haptoglobin 303 (H) 33 - 278 mg/dL    Apolipoprotein A-1 150 116 - 209 mg/dL    Total Bilirubin 0.3 0.0 - 1.2 mg/dL    GGT 15 0 - 60 IU/L    ALT (SGPT) 46 (H) 0 - 40 IU/L    AST  (SGOT) P5P (Reference) 28 0 - 40 IU/L    Cholesterol, Total (Reference) 318 (H) 100 - 199 mg/dL    Glucose, Serum (Reference) 102 (H) 70 - 99 mg/dL    Triglycerides 160 (H) 0 - 149 mg/dL    Interpretations: (Reference) Comment       Comment:      Quantitative results of 10 biochemicals in combination with age and  gender, are analyzed using a computational algorithm to provide a  quantitative surrogate marker (0.0-1.0) of liver fibrosis (Metavir  F0-F4), hepatic steatosis (0.0-1.0, S0-S3), and Non-Alcoholic  Steato-Hepatitis (JAIME) (0.0-1.0, N0-N3), now known as Metabolic  Dysfunction-Associated Steatohepatitis (MASH). The absence of  steatosis (S<0.40) precludes the diagnosis of JIAME/MASH.  Fibrosis marker: In a study of 171 Non-Alcoholic Fatty Liver Disease  (NAFLD), now known as Metabolic Dysfunction-Associated Steatotic  Liver Disease (MASLD), patients where 23% had significant NAFLD/MASLD  fibrosis (Metavir F2-F4) and 11% had cirrhosis by liver biopsy, a  fibrosis result of >0.3 yielded a sensitivity of 83% and a  specificity of 78% for the detection of significant fibrosis.[1]  Steatosis marker: In a population of 2997 patients, where 61% had  significant steatosis (>=5%) on a liver biopsy, a steatosis score  >0.4 had a  sensitivity of 79% and a specificity of 50% for  identification of significant steatosis.[2]  JAIME/MASH marker: In a population of 1081 NAFLD/MASLD patients, where  51% had at least some JAIME/MASH by liver biopsy, a prediction of  JAIME/MASH had a sensitivity of 72% for identifying JAIME/MASH and a  specificity of 71%.[3]    Fibrosis Scoring: Comment       Comment:           <=0.21 = Stage F0 - No fibrosis  0.21 - 0.27 = Stage F0 - F1  0.27 - 0.31 = Stage F1 - Portal fibrosis  0.31 - 0.48 = Stage F1 - F2  0.48 - 0.58 = Stage F2 - Bridging fibrosis with few septa  0.58 - 0.72 = Stage F3 - Bridging fibrosis with many septa  0.72 - 0.74 = Stage F3 - F4        >0.74 = Stage F4 - Cirrhosis     Steatosis Scoring Comment       Comment:           <=0.40 = S0  - No Steatosis (<5%)  0.40 - 0.55 = S1  - Mild Steatosis                      (but Clinically Significant) (5-33%)        >0.55 = S2S3- Moderate to Severe Steatosis                      (Clinically Significant) (%)    JAIME Scoring Comment       Comment:           <=0.25 = N0 - No JAIME/MASH  0.25 - 0.50 = N1 - Mild JAIME/MASH  0.50 - 0.75 = N2 - Moderate JAIME/MASH        >0.75 = N3 - Severe JAIME/MASH    Limitations: Comment       Comment:      JAIME FibroSure(R) Plus is recommended for patients with suspected  non-alcoholic fatty liver disease, now known as Metabolic  Dysfunction-Associated Steatotic Liver Disease or MASLD.  It is not recommended for patients with other liver diseases.  It is also not recommended in patients with Gilbert Disease,  acute hemolysis, acute viral hepatitis, drug induced hepatitis,  genetic liver disease, autoimmune hepatitis and/or extra-hepatic  cholestasis. Any of these clinical situations may lead to  inaccurate quantitative predictions of fibrosis.    Comment Comment       Comment:      This test was developed and its performance characteristics  determined by [facility]. It has not been cleared or approved  by the Food and Drug Administration.  For questions regarding this report please contact customer service  at [phone].  References:  1.  Max LUNA. et al. Diagnostic Value of Biochemical Markers  (FibroTest) for the prediction of Liver Fibrosis in patients with  Non-Alcoholic Fatty Liver Disease. BMC Gastroenterology 2006; 6:6.  2.  Kameron ALVAREZ. et al. The Diagnostic Performance of a Simplified  Blood Test (SteatoTest-2) for the Prediction of Liver Steatosis.  Eur J Gastroenterol Hepatol. 2019; 31:393-402.  3.  Kameron ALVAREZ. et al. Diagnostic performance of a new noninvasive  test for nonalcoholic steatohepatitis using a simplified histological  reference. Eur J Gastroenterol Hepatol. 2018 May; 30:569-577.   MARK    Result Value Ref Range    MARK Direct Negative Negative   Protein Electrophoresis, Total   Result Value Ref Range    Total Protein 6.6 6.0 - 8.5 g/dL    Albumin 3.7 2.9 - 4.4 g/dL    Alpha-1-Globulin 0.2 0.0 - 0.4 g/dL    Alpha-2-Globulin 0.8 0.4 - 1.0 g/dL    Beta Globulin 1.2 0.7 - 1.3 g/dL    Gamma Globulin 0.7 0.4 - 1.8 g/dL    M-Keith Not Observed Not Observed g/dL    Globulin 2.9 2.2 - 3.9 g/dL    A/G Ratio 1.3 0.7 - 1.7    Please note Comment       Comment:      Protein electrophoresis scan will follow via computer, mail, or   delivery.   Iron Profile   Result Value Ref Range    Iron 127 37 - 145 mcg/dL    Iron Saturation (TSAT) 23 20 - 50 %    Transferrin 368 (H) 200 - 360 mg/dL    TIBC 548 (H) 298 - 536 mcg/dL   Ferritin   Result Value Ref Range    Ferritin 21.50 13.00 - 150.00 ng/mL   Protime-INR   Result Value Ref Range    Protime 13.3 12.2 - 15.3 Seconds    INR 0.95 0.89 - 1.12   IgG, IgA, IgM   Result Value Ref Range    IgG 715 700 - 1,600 mg/dL    IgM 221 40 - 230 mg/dL    IgA 140 70 - 400 mg/dL   CBC Auto Differential   Result Value Ref Range    WBC 8.93 3.40 - 10.80 10*3/mm3    RBC 4.04 3.77 - 5.28 10*6/mm3    Hemoglobin 13.1 12.0 - 15.9 g/dL    Hematocrit 38.8 34.0 - 46.6 %    MCV 96.0 79.0 - 97.0 fL    MCH 32.4 26.6 - 33.0 pg    MCHC 33.8 31.5 - 35.7 g/dL    RDW 11.8 (L) 12.3 - 15.4 %    RDW-SD 40.7 37.0 - 54.0 fl    MPV 10.5 6.0 - 12.0 fL    Platelets 340 140 - 450 10*3/mm3    Neutrophil % 58.4 42.7 - 76.0 %    Lymphocyte % 33.5 19.6 - 45.3 %    Monocyte % 5.6 5.0 - 12.0 %    Eosinophil % 1.9 0.3 - 6.2 %    Basophil % 0.4 0.0 - 1.5 %    Immature Grans % 0.2 0.0 - 0.5 %    Neutrophils, Absolute 5.21 1.70 - 7.00 10*3/mm3    Lymphocytes, Absolute 2.99 0.70 - 3.10 10*3/mm3    Monocytes, Absolute 0.50 0.10 - 0.90 10*3/mm3    Eosinophils, Absolute 0.17 0.00 - 0.40 10*3/mm3    Basophils, Absolute 0.04 0.00 - 0.20 10*3/mm3    Immature Grans, Absolute 0.02 0.00 - 0.05 10*3/mm3    nRBC 0.0 0.0 - 0.2  /100 WBC       labs reveal the following:     - JAIME Fibrosure reveals F0 (none) fibrosis, S2/3 (moderate steatosis). Her total cholesterol was significantly elevated at 318, and triglycerides 160; recommend close follow up with PCP regarding such.   - MARK and autoimmune liver disease workup negative  - CBC, CMP, PT/INR, iron profile, ferritin within normal limits  - immunity to Hep A/B from previous vaccinations  - celiac panel negative     Recommend obtaining HIDA scan as planned and proceed with scopes as scheduled.               If you have any questions or concerns, please don't hesitate to call.         Sincerely,        Sara Maradiaga PA-C

## 2025-05-23 NOTE — PROGRESS NOTES
Please let Alessandra know that her labs reveal the following:    - JAIME Fibrosure reveals F0 (none) fibrosis, S2/3 (moderate steatosis). Her total cholesterol was significantly elevated at 318, and triglycerides 160; recommend close follow up with PCP regarding such.   - MARK and autoimmune liver disease workup negative  - CBC, CMP, PT/INR, iron profile, ferritin within normal limits  - immunity to Hep A/B from previous vaccinations  - celiac panel negative    Recommend obtaining HIDA scan as planned and proceed with scopes as scheduled.

## 2025-05-29 ENCOUNTER — TELEPHONE (OUTPATIENT)
Dept: GASTROENTEROLOGY | Facility: CLINIC | Age: 40
End: 2025-05-29

## 2025-05-29 NOTE — TELEPHONE ENCOUNTER
Caller: DONAVAN SRINIVASAN    Relationship to patient: SELF    Best call back number: [phone]    Chief complaint: NEEDS TO R/S EGD    Type of visit: EGD     Requested date: THE FOLLOWING WEEK IF POSSIBLE    If rescheduling, when is the original appointment: 6.17     Additional notes:

## 2025-06-23 ENCOUNTER — HOSPITAL ENCOUNTER (OUTPATIENT)
Dept: SLEEP MEDICINE | Facility: HOSPITAL | Age: 40
Discharge: HOME | End: 2025-06-23
Admitting: NURSE PRACTITIONER
Payer: COMMERCIAL

## 2025-06-23 VITALS — BODY MASS INDEX: 26.58 KG/M2 | HEIGHT: 63 IN | WEIGHT: 150 LBS

## 2025-06-23 DIAGNOSIS — G47.33 OSA (OBSTRUCTIVE SLEEP APNEA): ICD-10-CM

## 2025-06-23 DIAGNOSIS — R63.4 WEIGHT LOSS: ICD-10-CM

## 2025-06-23 PROCEDURE — 95800 SLP STDY UNATTENDED: CPT

## 2025-06-30 ENCOUNTER — TELEPHONE (OUTPATIENT)
Dept: SLEEP MEDICINE | Age: 40
End: 2025-06-30
Payer: COMMERCIAL

## 2025-06-30 NOTE — TELEPHONE ENCOUNTER
Spoke with patient regarding sleep study results, patient is to continue with pap therapy.  Patient was understanding.

## 2025-07-08 ENCOUNTER — HOSPITAL ENCOUNTER (OUTPATIENT)
Dept: NUCLEAR MEDICINE | Facility: HOSPITAL | Age: 40
Discharge: HOME | End: 2025-07-08
Admitting: PHYSICIAN ASSISTANT
Payer: COMMERCIAL

## 2025-07-08 DIAGNOSIS — R10.11 RUQ ABDOMINAL PAIN: ICD-10-CM

## 2025-07-08 DIAGNOSIS — R14.0 BLOATING: ICD-10-CM

## 2025-07-08 DIAGNOSIS — R74.8 ELEVATED LIVER ENZYMES: ICD-10-CM

## 2025-07-08 PROCEDURE — A9537 TC99M MEBROFENIN: HCPCS | Performed by: PHYSICIAN ASSISTANT

## 2025-07-08 PROCEDURE — 34310000005 TECHNETIUM TC 99M MEBROFENIN KIT: Performed by: PHYSICIAN ASSISTANT

## 2025-07-08 PROCEDURE — 25010000002 SINCALIDE PER 5 MCG: Performed by: NURSE PRACTITIONER

## 2025-07-08 PROCEDURE — 78227 HEPATOBIL SYST IMAGE W/DRUG: CPT

## 2025-07-08 RX ORDER — SINCALIDE 5 UG/5ML
0.02 INJECTION, POWDER, LYOPHILIZED, FOR SOLUTION INTRAVENOUS ONCE
Status: COMPLETED | OUTPATIENT
Start: 2025-07-08 | End: 2025-07-08

## 2025-07-08 RX ORDER — KIT FOR THE PREPARATION OF TECHNETIUM TC 99M MEBROFENIN 45 MG/10ML
1 INJECTION, POWDER, LYOPHILIZED, FOR SOLUTION INTRAVENOUS
Status: COMPLETED | OUTPATIENT
Start: 2025-07-08 | End: 2025-07-08

## 2025-07-08 RX ADMIN — SINCALIDE 1.4 MCG: 5 INJECTION, POWDER, LYOPHILIZED, FOR SOLUTION INTRAVENOUS at 15:22

## 2025-07-08 RX ADMIN — MEBROFENIN 1 DOSE: 45 INJECTION, POWDER, LYOPHILIZED, FOR SOLUTION INTRAVENOUS at 14:23

## 2025-07-09 DIAGNOSIS — R74.8 ELEVATED LIVER ENZYMES: ICD-10-CM

## 2025-07-09 DIAGNOSIS — R10.11 RUQ ABDOMINAL PAIN: ICD-10-CM

## 2025-07-09 DIAGNOSIS — R14.0 BLOATING: ICD-10-CM

## 2025-07-09 DIAGNOSIS — R94.8 ABNORMAL BILIARY HIDA SCAN: Primary | ICD-10-CM

## 2025-07-10 ENCOUNTER — TELEPHONE (OUTPATIENT)
Dept: GASTROENTEROLOGY | Facility: CLINIC | Age: 40
End: 2025-07-10

## 2025-07-10 ENCOUNTER — OUTSIDE FACILITY SERVICE (OUTPATIENT)
Dept: GASTROENTEROLOGY | Facility: CLINIC | Age: 40
End: 2025-07-10
Payer: COMMERCIAL

## 2025-07-10 PROCEDURE — 88305 TISSUE EXAM BY PATHOLOGIST: CPT | Performed by: INTERNAL MEDICINE

## 2025-07-10 PROCEDURE — 88342 IMHCHEM/IMCYTCHM 1ST ANTB: CPT | Performed by: INTERNAL MEDICINE

## 2025-07-10 PROCEDURE — 43239 EGD BIOPSY SINGLE/MULTIPLE: CPT | Performed by: INTERNAL MEDICINE

## 2025-07-10 NOTE — TELEPHONE ENCOUNTER
Hub staff attempted to follow warm transfer process and was unsuccessful     Caller: Alessandra Villa    Relationship to patient: Self    Best call back number: [phone]    Patient is needing: PATIENT CALLED PROCEDURE TODAY WITH DR MORALES TWO HOURS AGO HER SUGARS DROPPED TO 98. WHAT  SHOULD SHE DO IF IT KEEPS DROPPING PRIOR TO HER PROCEDURE, NOT ON INSULIN ON [place] BUT SKIPPED LAST SHOT DUE TO HAVING TO NOT TAKE IN ORDER TO PROCEED WITHPROCEDURE

## 2025-07-10 NOTE — TELEPHONE ENCOUNTER
Called patient and advised her to drink apple juice if sugar is low- NPO within 2 hours of arrival time for procedure.

## 2025-07-11 ENCOUNTER — LAB REQUISITION (OUTPATIENT)
Dept: LAB | Facility: HOSPITAL | Age: 40
End: 2025-07-11
Payer: COMMERCIAL

## 2025-07-11 DIAGNOSIS — R14.0 ABDOMINAL DISTENSION (GASEOUS): ICD-10-CM

## 2025-07-11 DIAGNOSIS — R13.19 OTHER DYSPHAGIA: ICD-10-CM

## 2025-07-11 DIAGNOSIS — R10.13 EPIGASTRIC PAIN: ICD-10-CM

## 2025-07-11 DIAGNOSIS — K21.00 GASTRO-ESOPHAGEAL REFLUX DISEASE WITH ESOPHAGITIS, WITHOUT BLEEDING: ICD-10-CM

## 2025-07-15 LAB
CYTO UR: NORMAL
LAB AP CASE REPORT: NORMAL
LAB AP CLINICAL INFORMATION: NORMAL
PATH REPORT.FINAL DX SPEC: NORMAL
PATH REPORT.GROSS SPEC: NORMAL

## 2025-07-20 ENCOUNTER — RESULTS FOLLOW-UP (OUTPATIENT)
Dept: LAB | Facility: HOSPITAL | Age: 40
End: 2025-07-20
Payer: COMMERCIAL

## 2025-07-20 NOTE — LETTER
Alessandra Villa  [address] [place] [id number]    July 21, 2025     Dear Ms. Villa:    Below are the results from your recent visit:    Resulted Orders   Tissue Pathology Exam   Result Value Ref Range    Case Report       Surgical Pathology Report                         Case: [id number]                                  Authorizing Provider:  Rasheed Hines MD    Collected:           07/10/2025 12:35 PM          Ordering Location:     [facility]   Received:            07/11/2025 08:10 AM                                 LABORATORY                                                                   Pathologist:           Bahman Mireles MD                                                            Specimens:   1) - Small Intestine, Duodenum                                                                      2) - Gastric, Antrum                                                                                3) - Gastric, Body                                                                                  4) - Esophagus, Distal                                                                              5) - Esophagus, Mid                                                                        Clinical Information       Epigastric pain  Abdominal distension (gaseous)  Gastro-esophageal reflux disease with esophagitis, without bleeding  Other dysphagia      Final Diagnosis       DUODENUM, BIOPSY:  Small bowel mucosa with no significant pathologic change.  Negative for significantly increased intraepithelial lymphocytes.  2.   STOMACH, ANTRUM BIOPSY:  Chronic gastritis.  Negative for Helicobacter pylori organisms on immunohistochemical stain (with appropriate control).  Negative for dysplasia or malignancy.    3.   STOMACH, BODY BIOPSY:  Oxyntic type mucosa with reactive changes.  No Helicobacter pylori-like organisms seen.  Negative for dysplasia or malignancy.  4.   DISTAL ESOPHAGUS,  "BIOPSY:  Squamocolumnar junctional mucosa with reactive changes.  Negative for intestinal metaplasia, significantly increased eosinophils, dysplasia, or malignancy.  5.   MIDESOPHAGUS, BIOPSY:  Squamous mucosa with reactive changes.  Negative for eosinophils, dysplasia, or malignancy.  GJK      Gross Description       1. Small Intestine, Duodenum.  Received in formalin labeled \"duodenum\" are multiple tan soft tissue fragments aggregating 0.8 x 0.8 x 0.2 cm submitted entirely in a single cassette.    2. Gastric, Antrum.  Received in formalin labeled \"gastric antrum\" are 2 tan soft tissue fragments aggregating 0.4 x 0.4 x 0.2 cm submitted entirely in a single cassette.    3. Gastric, Body.  Received in formalin labeled \"gastric body\" are 2 tan soft tissue fragments aggregating 0.4 x 0.4 x 0.2 cm submitted entirely in a single cassette.    4. Esophagus, Distal.  Received in formalin labeled \"distal esophagus\" are 2 white, ragged soft tissue fragments aggregating 0.4 x 0.4 x 0.2 cm submitted entirely in a single cassette.  Cysts    5. Esophagus, Mid.  Received in formalin labeled \"midesophagus\" are a few white, ragged soft tissue fragments aggregating 0.6 x 0.6 x 0.2 cm submitted entirely in a single cassette. HDM        Microscopic Description       The slides are reviewed and demonstrate histopathologic features supporting the above rendered diagnosis.           There was evidence for reflux. No H. Pylori or celiac sprue.          Tissue Pathology Exam    If you have any questions or concerns, please don't hesitate to call.         Sincerely,        Rasheed Hines MD      "